# Patient Record
Sex: FEMALE | Race: WHITE | NOT HISPANIC OR LATINO | Employment: FULL TIME | URBAN - METROPOLITAN AREA
[De-identification: names, ages, dates, MRNs, and addresses within clinical notes are randomized per-mention and may not be internally consistent; named-entity substitution may affect disease eponyms.]

---

## 2017-02-10 ENCOUNTER — TRANSCRIBE ORDERS (OUTPATIENT)
Dept: ADMINISTRATIVE | Facility: HOSPITAL | Age: 37
End: 2017-02-10

## 2017-02-10 DIAGNOSIS — G47.33 OBSTRUCTIVE SLEEP APNEA (ADULT) (PEDIATRIC): Primary | ICD-10-CM

## 2017-04-11 ENCOUNTER — TRANSCRIBE ORDERS (OUTPATIENT)
Dept: SLEEP CENTER | Facility: HOSPITAL | Age: 37
End: 2017-04-11

## 2017-04-11 ENCOUNTER — HOSPITAL ENCOUNTER (OUTPATIENT)
Dept: SLEEP CENTER | Facility: HOSPITAL | Age: 37
Discharge: HOME | End: 2017-04-11
Attending: FAMILY MEDICINE
Payer: COMMERCIAL

## 2017-04-11 DIAGNOSIS — G47.33 OBSTRUCTIVE SLEEP APNEA (ADULT) (PEDIATRIC): ICD-10-CM

## 2017-04-11 DIAGNOSIS — G47.33 OBSTRUCTIVE SLEEP APNEA (ADULT) (PEDIATRIC): Primary | ICD-10-CM

## 2018-03-04 DIAGNOSIS — I10 HYPERTENSION, UNSPECIFIED TYPE: Primary | ICD-10-CM

## 2018-03-04 RX ORDER — LOSARTAN POTASSIUM AND HYDROCHLOROTHIAZIDE 12.5; 5 MG/1; MG/1
1 TABLET ORAL DAILY
COMMUNITY
End: 2018-03-04 | Stop reason: SDUPTHER

## 2018-03-05 RX ORDER — LOSARTAN POTASSIUM AND HYDROCHLOROTHIAZIDE 12.5; 5 MG/1; MG/1
1 TABLET ORAL DAILY
Qty: 30 TABLET | Refills: 4 | Status: SHIPPED | OUTPATIENT
Start: 2018-03-05 | End: 2018-03-30 | Stop reason: SDUPTHER

## 2018-03-30 DIAGNOSIS — I10 HYPERTENSION, UNSPECIFIED TYPE: Primary | ICD-10-CM

## 2018-03-30 RX ORDER — HYDROCHLOROTHIAZIDE 25 MG/1
25 TABLET ORAL 2 TIMES DAILY
Qty: 60 TABLET | Refills: 5 | Status: SHIPPED | OUTPATIENT
Start: 2018-03-30 | End: 2018-10-10 | Stop reason: SDUPTHER

## 2018-03-30 RX ORDER — POTASSIUM CHLORIDE 1500 MG/1
1 TABLET, FILM COATED, EXTENDED RELEASE ORAL 2 TIMES DAILY
Qty: 60 TABLET | Refills: 0 | Status: SHIPPED | OUTPATIENT
Start: 2018-03-30 | End: 2018-12-06 | Stop reason: SDUPTHER

## 2018-03-30 RX ORDER — LOSARTAN POTASSIUM AND HYDROCHLOROTHIAZIDE 12.5; 5 MG/1; MG/1
1 TABLET ORAL DAILY
Qty: 30 TABLET | Refills: 0 | Status: SHIPPED | OUTPATIENT
Start: 2018-03-30 | End: 2018-09-06 | Stop reason: SDUPTHER

## 2018-03-30 RX ORDER — POTASSIUM CHLORIDE 1500 MG/1
TABLET, FILM COATED, EXTENDED RELEASE ORAL
COMMUNITY
End: 2018-03-30 | Stop reason: SDUPTHER

## 2018-06-04 ENCOUNTER — OFFICE VISIT (OUTPATIENT)
Dept: ENDOCRINOLOGY | Facility: HOSPITAL | Age: 38
End: 2018-06-04
Payer: COMMERCIAL

## 2018-06-04 VITALS
BODY MASS INDEX: 34.65 KG/M2 | HEIGHT: 67 IN | SYSTOLIC BLOOD PRESSURE: 110 MMHG | DIASTOLIC BLOOD PRESSURE: 88 MMHG | WEIGHT: 220.8 LBS | HEART RATE: 88 BPM

## 2018-06-04 DIAGNOSIS — E03.9 HYPOTHYROIDISM, UNSPECIFIED TYPE: Primary | ICD-10-CM

## 2018-06-04 DIAGNOSIS — I10 HYPERTENSION, UNSPECIFIED TYPE: ICD-10-CM

## 2018-06-04 DIAGNOSIS — E89.2 HYPOPARATHYROIDISM AFTER PROCEDURE (HCC): ICD-10-CM

## 2018-06-04 DIAGNOSIS — Z86.39 HISTORY OF GRAVES' DISEASE: ICD-10-CM

## 2018-06-04 PROCEDURE — 99244 OFF/OP CNSLTJ NEW/EST MOD 40: CPT | Performed by: INTERNAL MEDICINE

## 2018-06-04 RX ORDER — LEVOTHYROXINE SODIUM 175 MCG
175 TABLET ORAL DAILY
Refills: 1 | COMMUNITY
Start: 2018-05-23 | End: 2018-12-06 | Stop reason: SDUPTHER

## 2018-06-04 RX ORDER — CALCITRIOL 0.5 UG/1
2 CAPSULE, LIQUID FILLED ORAL DAILY
Refills: 0 | COMMUNITY
Start: 2018-05-24 | End: 2018-12-06 | Stop reason: SDUPTHER

## 2018-06-04 RX ORDER — DEXAMETHASONE 1 MG
TABLET ORAL
Qty: 1 TABLET | Refills: 0 | Status: SHIPPED | OUTPATIENT
Start: 2018-06-04 | End: 2018-12-03 | Stop reason: SDUPTHER

## 2018-06-04 NOTE — LETTER
June 4, 2018     Dunia Dixon, [address] [address]    Patient: Nimesh Gonsalves   YOB: 1980   Date of Visit: 6/4/2018       Dear Dr iKmmy lFores: Thank you for referring Nimesh Gonsalves to me for evaluation  Below are my notes for this consultation  If you have questions, please do not hesitate to call me  I look forward to following your patient along with you  Sincerely,        Deanna Villarreal DO        CC: No Recipients  Deanna Villarreal DO  6/4/2018  8:25 AM  Sign at close encounter  6/4/2018    Assessment/Plan      Diagnoses and all orders for this visit:    Hypothyroidism, unspecified type  -     TSH, 3rd generation Lab Collect; Future  -     T4, free Lab Collect; Future  -     TSH, 3rd generation Lab Collect  -     T4, free Lab Collect    Hypertension, unspecified type  -     Cortisol Level, AM Specimen- Lab Collect; Future  -     Dexamethasone; Future  -     dexamethasone (DECADRON) 1 mg tablet; Take 1 tab at 10pm night before cortisol blood work  -     Aldosterone; Future  -     Renin Direct Assay; Future  -     Insulin-like growth factor; Future  -     Cortisol Level, AM Specimen- Lab Collect  -     Aldosterone  -     Renin Direct Assay  -     Insulin-like growth factor    Hypoparathyroidism after procedure (HCC)  -     Comprehensive metabolic panel; Future  -     PTH, intact; Future  -     Comprehensive metabolic panel  -     PTH, intact    History of Graves' disease    Other orders  -     calcitriol (ROCALTROL) 0 5 MCG capsule; Take 2 capsules by mouth daily  -     SYNTHROID 175 MCG tablet; Take 175 mcg by mouth daily  -     Calcium Carbonate ([employer]S CALCIUM-600 PO); Take 1,200 mg by mouth        Assessment/Plan:  1  Postsurgical hypothyroidism:  Clinically and biochemically euthyroid on Synthroid brand 175 mcg tablets daily  Continue this dose and recheck TSH free T4 before next appointment which will be in 6 months      2   Postsurgical hypoparathyroidism:  Calcium level is appropriate on calcitriol 0 5 mcg capsules, 2 tabs daily as well as 1200 mg of calcium daily  Briefly discussed Tammi Hahn as an option to treat hypoparathyroidism in the future  Plan to repeat CMP and PTH before next appointment  3   History of hypertension as well as hypokalemia, possible sleep apnea: With next set of blood work will screen for aldosterone, renin, IGF-1, 1 mg overnight dexamethasone suppression test   Will discuss at her follow-up appointment  CC:  Hypothyroidism    History of Present Illness     HPI: Hakan Schroeder is a 40y o  year old female with history of Graves disease status post thyroidectomy with resultant hypothyroidism as well as postsurgical hypoparathyroidism presents to [facility] care  Previous patient doctor Saadia  Overall she is doing well without any significant changes in her health or medications  She takes Synthroid brand 175 mcg daily  She does take calcitriol 0 5 mcg tablets, 2 tablets daily as well as 1200 mg of calcium daily  She does report occasional muscle cramping and some perioral numbness and paresthesias  This has been stable lately  In the past she notes she had a history of severe hypercalcemia but over the past few visits this has remained stable  She also takes hydrochlorothiazide for her blood pressure  He has had hypertension for about 10 years  She currently takes hydrochlorothiazide 25 mg tablets, 2 tablets daily  She is also on losartan-HCTZ combo 50-12 5 mg daily  She is also on potassium chloride 40 mEq twice a day  She reports she does have a family history of hypertension but denies knowing if anybody was diagnosed early on  She denies easy bruising or proximal muscle weakness or osteoporosis  She does have a history of requiring a fertility specialist for pregnancy  She reports normal growth, development, puberty    She was evaluated for sleep apnea but has not had a sleep study yet to confirm this  Review of Systems   Constitutional: Negative for chills, fatigue and fever  HENT: Negative for trouble swallowing and voice change  Eyes: Negative for visual disturbance  Respiratory: Negative for shortness of breath  Cardiovascular: Negative for chest pain, palpitations and leg swelling  Gastrointestinal: Negative for abdominal pain, nausea and vomiting  Endocrine: Negative for polydipsia and polyuria  Musculoskeletal: Negative for arthralgias and myalgias  Skin: Negative for rash  Neurological: Negative for dizziness, tremors and weakness  Hematological: Negative for adenopathy  Psychiatric/Behavioral: Negative for agitation and confusion  Historical Information   History reviewed  No pertinent past medical history  History reviewed  No pertinent surgical history    Social History   History   Alcohol use Not on file     History   Drug use: Unknown     History   Smoking Status    Former Smoker    Packs/day: 1 00    Types: Cigarettes    Quit date: 2009   Smokeless Tobacco    Never Used     Family History:   Family History   Problem Relation Age of Onset    Hypertension Mother     Obesity Mother     Hyperlipidemia Mother     Diabetes unspecified Father     Arthritis Father     Hypertension Father     Cancer Maternal Grandmother     Heart disease Paternal Grandmother     Heart disease Paternal Grandfather     Dementia Maternal Grandfather        Meds/Allergies   Current Outpatient Prescriptions   Medication Sig Dispense Refill    calcitriol (ROCALTROL) 0 5 MCG capsule Take 2 capsules by mouth daily  0    Calcium Carbonate (CVS CALCIUM-600 PO) Take 1,200 mg by mouth      hydrochlorothiazide (HYDRODIURIL) 25 mg tablet Take 1 tablet (25 mg total) by mouth 2 (two) times a day 60 tablet 5    losartan-hydrochlorothiazide (HYZAAR) 50-12 5 mg per tablet Take 1 tablet by mouth daily 30 tablet 0    Potassium Chloride ER 20 MEQ TBCR Take 1 tablet (20 mEq total) by mouth 2 (two) times a day (Patient taking differently: Take 2 tablets by mouth 2 (two) times a day  ) 60 tablet 0    SYNTHROID 175 MCG tablet Take 175 mcg by mouth daily  1    dexamethasone (DECADRON) 1 mg tablet Take 1 tab at 10pm night before cortisol blood work  1 tablet 0     No current facility-administered medications for this visit  No Known Allergies    Objective   Vitals: Blood pressure 110/88, pulse 88, height 5' 7" (1 702 m), weight 100 kg (220 lb 12 8 oz)  Invasive Devices          No matching active lines, drains, or airways          Physical Exam   Constitutional: She is oriented to person, place, and time  She appears well-developed and well-nourished  No distress  HENT:   Head: Normocephalic and atraumatic  Mouth/Throat: No oropharyngeal exudate  Eyes: Conjunctivae and EOM are normal  Pupils are equal, round, and reactive to light  No scleral icterus  Neck: Normal range of motion  Neck supple  No thyromegaly present  Cardiovascular: Normal rate and regular rhythm  No murmur heard  Pulmonary/Chest: Effort normal and breath sounds normal  She has no wheezes  Abdominal: Soft  Bowel sounds are normal  She exhibits no distension  There is no tenderness  Musculoskeletal: Normal range of motion  She exhibits no edema  Neurological: She is alert and oriented to person, place, and time  She exhibits normal muscle tone  Skin: Skin is warm and dry  No rash noted  She is not diaphoretic  Psychiatric: She has a normal mood and affect  Her behavior is normal    Vitals reviewed  The history was obtained from the review of the chart and from the patient  Lab Results:       Labs from lab core 5/31/18:  Glucose 103, BUN 15, creatinine 0 91, GFR 81, sodium 140, potassium 3 5, calcium 9 1, albumin 4 2, AST 36, ALT 51, alk phos 60, total cholesterol 211, triglycerides 135, HDL 44, , TSH 2 44, free thyroxine index 2 3, A1c 5 6      Future Appointments  Date Time Provider Oumou Sanders   12/6/2018 7:30 AM Susan Flaherty DO [facility]

## 2018-06-04 NOTE — PROGRESS NOTES
6/4/2018    Assessment/Plan      Diagnoses and all orders for this visit:    Hypothyroidism, unspecified type  -     TSH, 3rd generation Lab Collect; Future  -     T4, free Lab Collect; Future  -     TSH, 3rd generation Lab Collect  -     T4, free Lab Collect    Hypertension, unspecified type  -     Cortisol Level, AM Specimen- Lab Collect; Future  -     Dexamethasone; Future  -     dexamethasone (DECADRON) 1 mg tablet; Take 1 tab at 10pm night before cortisol blood work  -     Aldosterone; Future  -     Renin Direct Assay; Future  -     Insulin-like growth factor; Future  -     Cortisol Level, AM Specimen- Lab Collect  -     Aldosterone  -     Renin Direct Assay  -     Insulin-like growth factor    Hypoparathyroidism after procedure (HCC)  -     Comprehensive metabolic panel; Future  -     PTH, intact; Future  -     Comprehensive metabolic panel  -     PTH, intact    History of Graves' disease    Other orders  -     calcitriol (ROCALTROL) 0 5 MCG capsule; Take 2 capsules by mouth daily  -     SYNTHROID 175 MCG tablet; Take 175 mcg by mouth daily  -     Calcium Carbonate (CVS CALCIUM-600 PO); Take 1,200 mg by mouth        Assessment/Plan:  1  Postsurgical hypothyroidism:  Clinically and biochemically euthyroid on Synthroid brand 175 mcg tablets daily  Continue this dose and recheck TSH free T4 before next appointment which will be in 6 months  2   Postsurgical hypoparathyroidism:  Calcium level is appropriate on calcitriol 0 5 mcg capsules, 2 tabs daily as well as 1200 mg of calcium daily  Briefly discussed Angela Mola as an option to treat hypoparathyroidism in the future  Plan to repeat CMP and PTH before next appointment  3   History of hypertension as well as hypokalemia, possible sleep apnea: With next set of blood work will screen for aldosterone, renin, IGF-1, 1 mg overnight dexamethasone suppression test   Will discuss at her follow-up appointment        CC:  Hypothyroidism    History of Present Illness     HPI: Cayetano Reeves is a 40y o  year old female with history of Graves disease status post thyroidectomy with resultant hypothyroidism as well as postsurgical hypoparathyroidism presents to [facility] care  Previous patient doctor Zee  Overall she is doing well without any significant changes in her health or medications  She takes Synthroid brand 175 mcg daily  She does take calcitriol 0 5 mcg tablets, 2 tablets daily as well as 1200 mg of calcium daily  She does report occasional muscle cramping and some perioral numbness and paresthesias  This has been stable lately  In the past she notes she had a history of severe hypercalcemia but over the past few visits this has remained stable  She also takes hydrochlorothiazide for her blood pressure  He has had hypertension for about 10 years  She currently takes hydrochlorothiazide 25 mg tablets, 2 tablets daily  She is also on losartan-HCTZ combo 50-12 5 mg daily  She is also on potassium chloride 40 mEq twice a day  She reports she does have a family history of hypertension but denies knowing if anybody was diagnosed early on  She denies easy bruising or proximal muscle weakness or osteoporosis  She does have a history of requiring a fertility specialist for pregnancy  She reports normal growth, development, puberty  She was evaluated for sleep apnea but has not had a sleep study yet to confirm this  Review of Systems   Constitutional: Negative for chills, fatigue and fever  HENT: Negative for trouble swallowing and voice change  Eyes: Negative for visual disturbance  Respiratory: Negative for shortness of breath  Cardiovascular: Negative for chest pain, palpitations and leg swelling  Gastrointestinal: Negative for abdominal pain, nausea and vomiting  Endocrine: Negative for polydipsia and polyuria  Musculoskeletal: Negative for arthralgias and myalgias  Skin: Negative for rash     Neurological: Negative for dizziness, tremors and weakness  Hematological: Negative for adenopathy  Psychiatric/Behavioral: Negative for agitation and confusion  Historical Information   History reviewed  No pertinent past medical history  History reviewed  No pertinent surgical history  Social History   History   Alcohol use Not on file     History   Drug use: Unknown     History   Smoking Status    Former Smoker    Packs/day: 1 00    Types: Cigarettes    Quit date: 2009   Smokeless Tobacco    Never Used     Family History:   Family History   Problem Relation Age of Onset    Hypertension Mother     Obesity Mother     Hyperlipidemia Mother     Diabetes unspecified Father     Arthritis Father     Hypertension Father     Cancer Maternal Grandmother     Heart disease Paternal Grandmother     Heart disease Paternal Grandfather     Dementia Maternal Grandfather        Meds/Allergies   Current Outpatient Prescriptions   Medication Sig Dispense Refill    calcitriol (ROCALTROL) 0 5 MCG capsule Take 2 capsules by mouth daily  0    Calcium Carbonate (CVS CALCIUM-600 PO) Take 1,200 mg by mouth      hydrochlorothiazide (HYDRODIURIL) 25 mg tablet Take 1 tablet (25 mg total) by mouth 2 (two) times a day 60 tablet 5    losartan-hydrochlorothiazide (HYZAAR) 50-12 5 mg per tablet Take 1 tablet by mouth daily 30 tablet 0    Potassium Chloride ER 20 MEQ TBCR Take 1 tablet (20 mEq total) by mouth 2 (two) times a day (Patient taking differently: Take 2 tablets by mouth 2 (two) times a day  ) 60 tablet 0    SYNTHROID 175 MCG tablet Take 175 mcg by mouth daily  1    dexamethasone (DECADRON) 1 mg tablet Take 1 tab at 10pm night before cortisol blood work  1 tablet 0     No current facility-administered medications for this visit  No Known Allergies    Objective   Vitals: Blood pressure 110/88, pulse 88, height 5' 7" (1 702 m), weight 100 kg (220 lb 12 8 oz)    Invasive Devices          No matching active lines, drains, or airways          Physical Exam   Constitutional: She is oriented to person, place, and time  She appears well-developed and well-nourished  No distress  HENT:   Head: Normocephalic and atraumatic  Mouth/Throat: No oropharyngeal exudate  Eyes: Conjunctivae and EOM are normal  Pupils are equal, round, and reactive to light  No scleral icterus  Neck: Normal range of motion  Neck supple  No thyromegaly present  Cardiovascular: Normal rate and regular rhythm  No murmur heard  Pulmonary/Chest: Effort normal and breath sounds normal  She has no wheezes  Abdominal: Soft  Bowel sounds are normal  She exhibits no distension  There is no tenderness  Musculoskeletal: Normal range of motion  She exhibits no edema  Neurological: She is alert and oriented to person, place, and time  She exhibits normal muscle tone  Skin: Skin is warm and dry  No rash noted  She is not diaphoretic  Psychiatric: She has a normal mood and affect  Her behavior is normal    Vitals reviewed  The history was obtained from the review of the chart and from the patient  Lab Results:       Labs from lab core 5/31/18:  Glucose 103, BUN 15, creatinine 0 91, GFR 81, sodium 140, potassium 3 5, calcium 9 1, albumin 4 2, AST 36, ALT 51, alk phos 60, total cholesterol 211, triglycerides 135, HDL 44, , TSH 2 44, free thyroxine index 2 3, A1c 5 6      Future Appointments  Date Time Provider Oumou Sanders   12/6/2018 7:30 AM DO AY[facility]A [facility]

## 2018-09-06 DIAGNOSIS — I10 HYPERTENSION, UNSPECIFIED TYPE: ICD-10-CM

## 2018-09-06 RX ORDER — LOSARTAN POTASSIUM AND HYDROCHLOROTHIAZIDE 12.5; 5 MG/1; MG/1
TABLET ORAL
Qty: 30 TABLET | Refills: 0 | Status: SHIPPED | OUTPATIENT
Start: 2018-09-06 | End: 2018-10-10 | Stop reason: SDUPTHER

## 2018-10-10 DIAGNOSIS — I10 HYPERTENSION, UNSPECIFIED TYPE: ICD-10-CM

## 2018-10-10 RX ORDER — LOSARTAN POTASSIUM AND HYDROCHLOROTHIAZIDE 12.5; 5 MG/1; MG/1
TABLET ORAL
Qty: 30 TABLET | Refills: 0 | Status: SHIPPED | OUTPATIENT
Start: 2018-10-10 | End: 2018-10-31 | Stop reason: SDUPTHER

## 2018-10-10 RX ORDER — HYDROCHLOROTHIAZIDE 25 MG/1
TABLET ORAL
Qty: 60 TABLET | Refills: 5 | Status: SHIPPED | OUTPATIENT
Start: 2018-10-10 | End: 2018-12-06 | Stop reason: SDUPTHER

## 2018-10-31 DIAGNOSIS — I10 HYPERTENSION, UNSPECIFIED TYPE: ICD-10-CM

## 2018-10-31 RX ORDER — LOSARTAN POTASSIUM AND HYDROCHLOROTHIAZIDE 12.5; 5 MG/1; MG/1
TABLET ORAL
Qty: 30 TABLET | Refills: 0 | Status: SHIPPED | OUTPATIENT
Start: 2018-10-31 | End: 2018-12-06 | Stop reason: SDUPTHER

## 2018-12-03 ENCOUNTER — TELEPHONE (OUTPATIENT)
Dept: ENDOCRINOLOGY | Facility: HOSPITAL | Age: 38
End: 2018-12-03

## 2018-12-03 DIAGNOSIS — I10 HYPERTENSION, UNSPECIFIED TYPE: ICD-10-CM

## 2018-12-03 RX ORDER — DEXAMETHASONE 1 MG
TABLET ORAL
Qty: 1 TABLET | Refills: 0 | Status: SHIPPED | OUTPATIENT
Start: 2018-12-03 | End: 2018-12-06

## 2018-12-03 NOTE — TELEPHONE ENCOUNTER
Pt needs new script for dexamethasone as she never picked up the last one that was prescribed  Send to [facility] #[id number]  Has 12/6/18 appt and needs labs done before then   Thanks

## 2018-12-06 ENCOUNTER — OFFICE VISIT (OUTPATIENT)
Dept: ENDOCRINOLOGY | Facility: HOSPITAL | Age: 38
End: 2018-12-06
Payer: COMMERCIAL

## 2018-12-06 VITALS
HEART RATE: 92 BPM | HEIGHT: 67 IN | BODY MASS INDEX: 34.58 KG/M2 | DIASTOLIC BLOOD PRESSURE: 80 MMHG | SYSTOLIC BLOOD PRESSURE: 110 MMHG | WEIGHT: 220.3 LBS

## 2018-12-06 DIAGNOSIS — I10 HYPERTENSION, UNSPECIFIED TYPE: ICD-10-CM

## 2018-12-06 DIAGNOSIS — E03.9 HYPOTHYROIDISM, UNSPECIFIED TYPE: Primary | ICD-10-CM

## 2018-12-06 DIAGNOSIS — Z86.39 HISTORY OF GRAVES' DISEASE: ICD-10-CM

## 2018-12-06 DIAGNOSIS — E89.2 HYPOPARATHYROIDISM AFTER PROCEDURE (HCC): ICD-10-CM

## 2018-12-06 DIAGNOSIS — R73.01 IMPAIRED FASTING GLUCOSE: ICD-10-CM

## 2018-12-06 PROCEDURE — 99214 OFFICE O/P EST MOD 30 MIN: CPT | Performed by: INTERNAL MEDICINE

## 2018-12-06 RX ORDER — CALCITRIOL 0.5 UG/1
1 CAPSULE, LIQUID FILLED ORAL DAILY
Qty: 30 CAPSULE | Refills: 11 | Status: SHIPPED | OUTPATIENT
Start: 2018-12-06 | End: 2018-12-20 | Stop reason: SDUPTHER

## 2018-12-06 RX ORDER — LEVOTHYROXINE SODIUM 175 MCG
TABLET ORAL
Qty: 30 TABLET | Refills: 11 | Status: SHIPPED | OUTPATIENT
Start: 2018-12-06 | End: 2019-12-20 | Stop reason: SDUPTHER

## 2018-12-06 RX ORDER — LOSARTAN POTASSIUM AND HYDROCHLOROTHIAZIDE 12.5; 5 MG/1; MG/1
1 TABLET ORAL DAILY
Qty: 30 TABLET | Refills: 11 | Status: SHIPPED | OUTPATIENT
Start: 2018-12-06 | End: 2019-12-20 | Stop reason: SDUPTHER

## 2018-12-06 RX ORDER — POTASSIUM CHLORIDE 1500 MG/1
2 TABLET, FILM COATED, EXTENDED RELEASE ORAL 2 TIMES DAILY
Qty: 120 TABLET | Refills: 11 | Status: SHIPPED | OUTPATIENT
Start: 2018-12-06 | End: 2019-12-20 | Stop reason: SDUPTHER

## 2018-12-06 RX ORDER — HYDROCHLOROTHIAZIDE 25 MG/1
25 TABLET ORAL 2 TIMES DAILY
Qty: 60 TABLET | Refills: 11 | Status: SHIPPED | OUTPATIENT
Start: 2018-12-06 | End: 2019-12-20 | Stop reason: SDUPTHER

## 2018-12-06 NOTE — LETTER
December 6, 2018     Charles Rushing, [address] [address]    Patient: Sharon Brewer   YOB: 1980   Date of Visit: 12/6/2018       Dear Dr Sidney Aviles: Thank you for referring Sharon Brewer to me for evaluation  Below are my notes for this consultation  If you have questions, please do not hesitate to call me  I look forward to following your patient along with you  Sincerely,        Heather Castillo DO        CC: No Recipients  Heather Castillo DO  12/6/2018  7:51 AM  Sign at close encounter  12/6/2018    Assessment/Plan      Diagnoses and all orders for this visit:    Hypothyroidism, unspecified type  -     SYNTHROID 175 MCG tablet; 1 tab daily  BRAND NECESSARY  -     TSH, 3rd generation Lab Collect; Future  -     T4, free Lab Collect; Future  -     TSH, 3rd generation Lab Collect  -     T4, free Lab Collect    Hypoparathyroidism after procedure (HCC)  -     calcitriol (ROCALTROL) 0 5 MCG capsule; Take 2 capsules (1 mcg total) by mouth daily  -     Comprehensive metabolic panel Lab Collect; Future  -     Comprehensive metabolic panel Lab Collect  -     Basic metabolic panel Lab Collect; Future  -     Basic metabolic panel Lab Collect    Hypertension, unspecified type  -     losartan-hydrochlorothiazide (HYZAAR) 50-12 5 mg per tablet; Take 1 tablet by mouth daily  -     hydrochlorothiazide (HYDRODIURIL) 25 mg tablet; Take 1 tablet (25 mg total) by mouth 2 (two) times a day  -     Potassium Chloride ER 20 MEQ TBCR; Take 2 tablets (40 mEq total) by mouth 2 (two) times a day    History of Graves' disease        Assessment/Plan:  1  Hypothyroidism postoperative:  Clinically and biochemically euthyroid on current Synthroid regimen  Continue current dose and see her back in 6 months with labs just prior  2   Hypoparathyroidism:  Calcium was low in recent blood work, but she does need a refill of her calcitriol  Continue current calcitriol dose and calcium  We will be decreasing her hydrochlorothiazide dose slightly so I have asked her to go for a repeat BMP 1 week after resuming her calcitriol and cutting her hydrochlorothiazide dose to 25 mg just once a day  She will continue the losartan-hydrochlorothiazide 50-12 5 mg daily as well  We will call her with these results  Plan to repeat CMP before next appointment  3   Hypertension:  I suggested that she get a blood pressure monitor at home to monitor blood pressure since we are reducing her hydrochlorothiazide dose of 25 mg daily and continuing her current losartan-hydrochlorothiazide combination dose  She had a normal IGF-1 level as well as a normal 1 mg overnight dexamethasone suppression test   Aldosterone and renin levels are still pending  Will call her with these results  4   Impaired fasting glucose:  Encourage diet, exercise, lifestyle, weight loss modifications  Will monitor this over time  CC: Hypothyroidism and hypoparathyroidism follow up    History of Present Illness     HPI: Aditi Lundberg is a 40y o  year old female with history of Graves disease status post thyroidectomy with resultant hypothyroidism as well as complicated by postsurgical hypoparathyroidism who presents for follow-up appointment  Overall she is feeling well without any significant new symptoms or changes to her health or medications  She is on Synthroid brand 175 mcg daily  Takes medication appropriately  For hypoparathyroidism she is on calcitriol 0 5 mcg tablets in takes 2 tablets daily as well as 1200 mg of calcium daily  She also has a history of hypertension managed on losartan-hydrochlorothiazide 50-12 5 mg daily  She also takes a separate hydrochlorothiazide 25 mg tablet  She is on potassium 40 mEq twice a day as well  She presents today for a follow-up appointment overall feels well  She has noticed that she has gained some weight recently and will be keeping a close eye on this    She has been out of her calcitriol for a few days which explains her low calcium in her recent blood work  She has had some more numbness and tingling in her face recently which she does not usually have she is on the calcitriol  Otherwise she feels well and denies any symptoms related bowl to hypothyroidism or hyperthyroidism  Review of Systems   Constitutional: Negative for fatigue  HENT: Negative for trouble swallowing and voice change  Eyes: Negative for visual disturbance  Respiratory: Negative for shortness of breath  Cardiovascular: Negative for palpitations and leg swelling  Gastrointestinal: Negative for abdominal pain, nausea and vomiting  Endocrine: Negative for cold intolerance, heat intolerance, polydipsia and polyuria  Musculoskeletal: Negative for arthralgias and myalgias  Skin: Negative for rash  Neurological: Negative for dizziness, tremors and weakness  Hematological: Negative for adenopathy  Psychiatric/Behavioral: Negative for agitation and confusion  Historical Information   No past medical history on file  No past surgical history on file    Social History   History   Alcohol use Not on file     History   Drug use: Unknown     History   Smoking Status    Former Smoker    Packs/day: 1 00    Types: Cigarettes    Quit date: 2009   Smokeless Tobacco    Never Used     Family History:   Family History   Problem Relation Age of Onset    Hypertension Mother     Obesity Mother     Hyperlipidemia Mother     Diabetes unspecified Father     Arthritis Father     Hypertension Father     Cancer Maternal Grandmother     Heart disease Paternal Grandmother     Heart disease Paternal Grandfather     Dementia Maternal Grandfather        Meds/Allergies   Current Outpatient Prescriptions   Medication Sig Dispense Refill    calcitriol (ROCALTROL) 0 5 MCG capsule Take 2 capsules (1 mcg total) by mouth daily 30 capsule 11    Calcium Carbonate (CVS CALCIUM-600 PO) Take 1,200 mg by mouth  hydrochlorothiazide (HYDRODIURIL) 25 mg tablet Take 1 tablet (25 mg total) by mouth 2 (two) times a day 60 tablet 11    losartan-hydrochlorothiazide (HYZAAR) 50-12 5 mg per tablet Take 1 tablet by mouth daily 30 tablet 11    Potassium Chloride ER 20 MEQ TBCR Take 2 tablets (40 mEq total) by mouth 2 (two) times a day 120 tablet 11    SYNTHROID 175 MCG tablet 1 tab daily  BRAND NECESSARY  30 tablet 11     No current facility-administered medications for this visit  No Known Allergies    Objective   Vitals: Blood pressure 110/80, pulse 92, height 5' 7" (1 702 m), weight 99 9 kg (220 lb 4 8 oz)  Invasive Devices          No matching active lines, drains, or airways          Physical Exam   Constitutional: She is oriented to person, place, and time  She appears well-developed and well-nourished  No distress  HENT:   Head: Normocephalic and atraumatic  Eyes: Pupils are equal, round, and reactive to light  Conjunctivae are normal    Neck: Normal range of motion  Neck supple  No thyromegaly present  Cardiovascular: Normal rate and regular rhythm  Pulmonary/Chest: Effort normal and breath sounds normal  No respiratory distress  Abdominal: Soft  Bowel sounds are normal  She exhibits no distension  Musculoskeletal: Normal range of motion  She exhibits no edema  Neurological: She is alert and oriented to person, place, and time  She exhibits normal muscle tone  Skin: Skin is warm and dry  No rash noted  She is not diaphoretic  Psychiatric: She has a normal mood and affect  Her behavior is normal    Vitals reviewed  The history was obtained from the review of the chart and from the patient  Lab Results:      Labs from [address] [place] [address] on 12/04/2018:  Glucose 114, BUN 16, creatinine 0 84, GFR 89, sodium 142, potassium 4 1, calcium 8 5, albumin 4 5, bilirubin 0 2, alk phos 65, AST 39, ALT 54, free T4 1 3, TSH 2 22, IGF-1 101, PTH 10, a m  cortisol after 1 mg dexamethasone 0 7        No future appointments  Portions of the record may have been created with voice recognition software  Occasional wrong word or "sound a like" substitutions may have occurred due to the inherent limitations of voice recognition software  Read the chart carefully and recognize, using context, where substitutions have occurred

## 2018-12-08 LAB
ALBUMIN SERPL-MCNC: 4.5 G/DL (ref 3.5–5.5)
ALBUMIN/GLOB SERPL: 1.5 {RATIO} (ref 1.2–2.2)
ALDOST SERPL-MCNC: 15.9 NG/DL (ref 0–30)
ALP SERPL-CCNC: 65 IU/L (ref 39–117)
ALT SERPL-CCNC: 54 IU/L (ref 0–32)
AST SERPL-CCNC: 39 IU/L (ref 0–40)
BILIRUB SERPL-MCNC: 0.2 MG/DL (ref 0–1.2)
BUN SERPL-MCNC: 16 MG/DL (ref 6–20)
BUN/CREAT SERPL: 19 (ref 9–23)
CALCIUM SERPL-MCNC: 8.5 MG/DL (ref 8.7–10.2)
CHLORIDE SERPL-SCNC: 98 MMOL/L (ref 96–106)
CO2 SERPL-SCNC: 27 MMOL/L (ref 20–29)
CORTIS AM PEAK SERPL-MCNC: 0.7 UG/DL (ref 6.2–19.4)
CREAT SERPL-MCNC: 0.84 MG/DL (ref 0.57–1)
GLOBULIN SER-MCNC: 3 G/DL (ref 1.5–4.5)
GLUCOSE SERPL-MCNC: 114 MG/DL (ref 65–99)
IGF-I SERPL-MCNC: 101 NG/ML (ref 69–227)
POTASSIUM SERPL-SCNC: 4.1 MMOL/L (ref 3.5–5.2)
PROT SERPL-MCNC: 7.5 G/DL (ref 6–8.5)
PTH-INTACT SERPL-MCNC: 10 PG/ML (ref 15–65)
RENIN PLAS-CCNC: 2.66 NG/ML/HR (ref 0.17–5.38)
SL AMB EGFR AFRICAN AMERICAN: 103 ML/MIN/1.73
SL AMB EGFR NON AFRICAN AMERICAN: 89 ML/MIN/1.73
SODIUM SERPL-SCNC: 142 MMOL/L (ref 134–144)
T4 FREE SERPL-MCNC: 1.3 NG/DL (ref 0.82–1.77)
TSH SERPL DL<=0.005 MIU/L-ACNC: 2.22 UIU/ML (ref 0.45–4.5)

## 2018-12-20 DIAGNOSIS — E89.2 HYPOPARATHYROIDISM AFTER PROCEDURE (HCC): ICD-10-CM

## 2018-12-20 NOTE — TELEPHONE ENCOUNTER
Pt takes her Calcitriol twice daily  When it was sent to the pharmacy it should have been sent at #60 not #30  Please resend

## 2018-12-21 RX ORDER — CALCITRIOL 0.5 UG/1
1 CAPSULE, LIQUID FILLED ORAL DAILY
Qty: 60 CAPSULE | Refills: 11 | Status: SHIPPED | OUTPATIENT
Start: 2018-12-21 | End: 2019-12-20 | Stop reason: SDUPTHER

## 2019-06-19 ENCOUNTER — OFFICE VISIT (OUTPATIENT)
Dept: ENDOCRINOLOGY | Facility: HOSPITAL | Age: 39
End: 2019-06-19
Payer: COMMERCIAL

## 2019-06-19 VITALS
BODY MASS INDEX: 36.91 KG/M2 | SYSTOLIC BLOOD PRESSURE: 120 MMHG | HEIGHT: 67 IN | DIASTOLIC BLOOD PRESSURE: 90 MMHG | HEART RATE: 105 BPM | WEIGHT: 235.2 LBS

## 2019-06-19 DIAGNOSIS — E03.9 HYPOTHYROIDISM, UNSPECIFIED TYPE: Primary | ICD-10-CM

## 2019-06-19 DIAGNOSIS — R73.01 IMPAIRED FASTING GLUCOSE: ICD-10-CM

## 2019-06-19 DIAGNOSIS — I10 ESSENTIAL HYPERTENSION: ICD-10-CM

## 2019-06-19 DIAGNOSIS — Z86.39 HISTORY OF GRAVES' DISEASE: ICD-10-CM

## 2019-06-19 DIAGNOSIS — E89.2 HYPOPARATHYROIDISM AFTER PROCEDURE (HCC): ICD-10-CM

## 2019-06-19 LAB
ALBUMIN SERPL-MCNC: 4.4 G/DL (ref 3.5–5.5)
ALBUMIN/GLOB SERPL: 1.6 {RATIO} (ref 1.2–2.2)
ALP SERPL-CCNC: 61 IU/L (ref 39–117)
ALT SERPL-CCNC: 76 IU/L (ref 0–32)
AST SERPL-CCNC: 58 IU/L (ref 0–40)
BILIRUB SERPL-MCNC: 0.4 MG/DL (ref 0–1.2)
BUN SERPL-MCNC: 19 MG/DL (ref 6–20)
BUN/CREAT SERPL: 20 (ref 9–23)
CALCIUM SERPL-MCNC: 9.7 MG/DL (ref 8.7–10.2)
CHLORIDE SERPL-SCNC: 99 MMOL/L (ref 96–106)
CO2 SERPL-SCNC: 27 MMOL/L (ref 20–29)
CREAT SERPL-MCNC: 0.95 MG/DL (ref 0.57–1)
GLOBULIN SER-MCNC: 2.7 G/DL (ref 1.5–4.5)
GLUCOSE SERPL-MCNC: 94 MG/DL (ref 65–99)
POTASSIUM SERPL-SCNC: 3.7 MMOL/L (ref 3.5–5.2)
PROT SERPL-MCNC: 7.1 G/DL (ref 6–8.5)
SL AMB EGFR AFRICAN AMERICAN: 88 ML/MIN/1.73
SL AMB EGFR NON AFRICAN AMERICAN: 76 ML/MIN/1.73
SODIUM SERPL-SCNC: 141 MMOL/L (ref 134–144)
T4 FREE SERPL-MCNC: 1.66 NG/DL (ref 0.82–1.77)
TSH SERPL DL<=0.005 MIU/L-ACNC: 2.74 UIU/ML (ref 0.45–4.5)

## 2019-06-19 PROCEDURE — 99214 OFFICE O/P EST MOD 30 MIN: CPT | Performed by: INTERNAL MEDICINE

## 2019-12-19 LAB
ALBUMIN SERPL-MCNC: 4.2 G/DL (ref 3.5–5.5)
ALBUMIN/GLOB SERPL: 1.6 {RATIO} (ref 1.2–2.2)
ALP SERPL-CCNC: 66 IU/L (ref 39–117)
ALT SERPL-CCNC: 52 IU/L (ref 0–32)
AST SERPL-CCNC: 41 IU/L (ref 0–40)
BILIRUB SERPL-MCNC: 0.3 MG/DL (ref 0–1.2)
BUN SERPL-MCNC: 12 MG/DL (ref 6–20)
BUN/CREAT SERPL: 13 (ref 9–23)
CALCIUM SERPL-MCNC: 9 MG/DL (ref 8.7–10.2)
CHLORIDE SERPL-SCNC: 98 MMOL/L (ref 96–106)
CO2 SERPL-SCNC: 26 MMOL/L (ref 20–29)
CREAT SERPL-MCNC: 0.96 MG/DL (ref 0.57–1)
EST. AVERAGE GLUCOSE BLD GHB EST-MCNC: 126 MG/DL
GLOBULIN SER-MCNC: 2.7 G/DL (ref 1.5–4.5)
GLUCOSE SERPL-MCNC: 86 MG/DL (ref 65–99)
HBA1C MFR BLD: 6 % (ref 4.8–5.6)
POTASSIUM SERPL-SCNC: 3.6 MMOL/L (ref 3.5–5.2)
PROT SERPL-MCNC: 6.9 G/DL (ref 6–8.5)
SL AMB EGFR AFRICAN AMERICAN: 86 ML/MIN/1.73
SL AMB EGFR NON AFRICAN AMERICAN: 75 ML/MIN/1.73
SODIUM SERPL-SCNC: 140 MMOL/L (ref 134–144)
T4 FREE SERPL-MCNC: 1.57 NG/DL (ref 0.82–1.77)
TSH SERPL DL<=0.005 MIU/L-ACNC: 4.79 UIU/ML (ref 0.45–4.5)

## 2019-12-20 ENCOUNTER — OFFICE VISIT (OUTPATIENT)
Dept: ENDOCRINOLOGY | Facility: HOSPITAL | Age: 39
End: 2019-12-20
Payer: COMMERCIAL

## 2019-12-20 VITALS
BODY MASS INDEX: 37.07 KG/M2 | DIASTOLIC BLOOD PRESSURE: 64 MMHG | SYSTOLIC BLOOD PRESSURE: 102 MMHG | HEART RATE: 110 BPM | WEIGHT: 236.2 LBS | HEIGHT: 67 IN

## 2019-12-20 DIAGNOSIS — I10 HYPERTENSION, UNSPECIFIED TYPE: ICD-10-CM

## 2019-12-20 DIAGNOSIS — R73.03 PREDIABETES: ICD-10-CM

## 2019-12-20 DIAGNOSIS — E89.2 HYPOPARATHYROIDISM AFTER PROCEDURE (HCC): ICD-10-CM

## 2019-12-20 DIAGNOSIS — Z86.39 HISTORY OF GRAVES' DISEASE: ICD-10-CM

## 2019-12-20 DIAGNOSIS — I10 ESSENTIAL HYPERTENSION: ICD-10-CM

## 2019-12-20 DIAGNOSIS — E03.9 HYPOTHYROIDISM, UNSPECIFIED TYPE: Primary | ICD-10-CM

## 2019-12-20 PROCEDURE — 99214 OFFICE O/P EST MOD 30 MIN: CPT | Performed by: INTERNAL MEDICINE

## 2019-12-20 PROCEDURE — 3074F SYST BP LT 130 MM HG: CPT | Performed by: INTERNAL MEDICINE

## 2019-12-20 PROCEDURE — 3078F DIAST BP <80 MM HG: CPT | Performed by: INTERNAL MEDICINE

## 2019-12-20 RX ORDER — LEVOTHYROXINE SODIUM 175 MCG
TABLET ORAL
Qty: 30 TABLET | Refills: 11 | Status: SHIPPED | OUTPATIENT
Start: 2019-12-20 | End: 2020-04-09 | Stop reason: SDUPTHER

## 2019-12-20 RX ORDER — LOSARTAN POTASSIUM AND HYDROCHLOROTHIAZIDE 12.5; 5 MG/1; MG/1
1 TABLET ORAL DAILY
Qty: 30 TABLET | Refills: 11 | Status: SHIPPED | OUTPATIENT
Start: 2019-12-20 | End: 2020-12-28 | Stop reason: SDUPTHER

## 2019-12-20 RX ORDER — HYDROCHLOROTHIAZIDE 25 MG/1
25 TABLET ORAL 2 TIMES DAILY
Qty: 60 TABLET | Refills: 11 | Status: SHIPPED | OUTPATIENT
Start: 2019-12-20 | End: 2020-12-28

## 2019-12-20 RX ORDER — POTASSIUM CHLORIDE 1500 MG/1
2 TABLET, FILM COATED, EXTENDED RELEASE ORAL 2 TIMES DAILY
Qty: 120 TABLET | Refills: 11 | Status: SHIPPED | OUTPATIENT
Start: 2019-12-20 | End: 2020-12-28 | Stop reason: SDUPTHER

## 2019-12-20 RX ORDER — CALCITRIOL 0.5 UG/1
1 CAPSULE, LIQUID FILLED ORAL DAILY
Qty: 60 CAPSULE | Refills: 11 | Status: SHIPPED | OUTPATIENT
Start: 2019-12-20 | End: 2020-12-28 | Stop reason: SDUPTHER
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
## 2020-04-07 LAB
ALBUMIN SERPL-MCNC: 4.3 G/DL (ref 3.8–4.8)
ALBUMIN/GLOB SERPL: 1.7 {RATIO} (ref 1.2–2.2)
ALP SERPL-CCNC: 68 IU/L (ref 39–117)
ALT SERPL-CCNC: 64 IU/L (ref 0–32)
AST SERPL-CCNC: 54 IU/L (ref 0–40)
BILIRUB SERPL-MCNC: 0.3 MG/DL (ref 0–1.2)
BUN SERPL-MCNC: 18 MG/DL (ref 6–20)
BUN/CREAT SERPL: 17 (ref 9–23)
CALCIUM SERPL-MCNC: 9.1 MG/DL (ref 8.7–10.2)
CHLORIDE SERPL-SCNC: 96 MMOL/L (ref 96–106)
CO2 SERPL-SCNC: 28 MMOL/L (ref 20–29)
CREAT SERPL-MCNC: 1.08 MG/DL (ref 0.57–1)
GLOBULIN SER-MCNC: 2.5 G/DL (ref 1.5–4.5)
GLUCOSE SERPL-MCNC: 91 MG/DL (ref 65–99)
POTASSIUM SERPL-SCNC: 3.8 MMOL/L (ref 3.5–5.2)
PROT SERPL-MCNC: 6.8 G/DL (ref 6–8.5)
SL AMB EGFR AFRICAN AMERICAN: 75 ML/MIN/1.73
SL AMB EGFR NON AFRICAN AMERICAN: 65 ML/MIN/1.73
SODIUM SERPL-SCNC: 141 MMOL/L (ref 134–144)
T4 FREE SERPL-MCNC: 1.44 NG/DL (ref 0.82–1.77)
TSH SERPL DL<=0.005 MIU/L-ACNC: 6 UIU/ML (ref 0.45–4.5)

## 2020-04-09 ENCOUNTER — TELEMEDICINE (OUTPATIENT)
Dept: ENDOCRINOLOGY | Facility: HOSPITAL | Age: 40
End: 2020-04-09
Payer: COMMERCIAL

## 2020-04-09 DIAGNOSIS — Z86.39 HISTORY OF GRAVES' DISEASE: ICD-10-CM

## 2020-04-09 DIAGNOSIS — E89.2 HYPOPARATHYROIDISM AFTER PROCEDURE (HCC): ICD-10-CM

## 2020-04-09 DIAGNOSIS — E03.9 HYPOTHYROIDISM, UNSPECIFIED TYPE: Primary | ICD-10-CM

## 2020-04-09 DIAGNOSIS — E66.9 CLASS 1 OBESITY WITHOUT SERIOUS COMORBIDITY WITH BODY MASS INDEX (BMI) OF 34.0 TO 34.9 IN ADULT, UNSPECIFIED OBESITY TYPE: ICD-10-CM

## 2020-04-09 DIAGNOSIS — R73.03 PREDIABETES: ICD-10-CM

## 2020-04-09 PROCEDURE — 99214 OFFICE O/P EST MOD 30 MIN: CPT | Performed by: INTERNAL MEDICINE

## 2020-04-09 RX ORDER — PHENTERMINE HYDROCHLORIDE 15 MG/1
CAPSULE ORAL
Qty: 30 CAPSULE | Refills: 2 | Status: SHIPPED | OUTPATIENT
Start: 2020-04-09 | End: 2020-11-06

## 2020-04-09 RX ORDER — LEVOTHYROXINE SODIUM 175 MCG
TABLET ORAL
Qty: 32 TABLET | Refills: 11
Start: 2020-04-09 | End: 2020-11-06

## 2020-11-05 LAB
ALBUMIN SERPL-MCNC: 4.3 G/DL (ref 3.8–4.8)
ALBUMIN/GLOB SERPL: 1.5 {RATIO} (ref 1.2–2.2)
ALP SERPL-CCNC: 73 IU/L (ref 39–117)
ALT SERPL-CCNC: 46 IU/L (ref 0–32)
AST SERPL-CCNC: 41 IU/L (ref 0–40)
BILIRUB SERPL-MCNC: 0.5 MG/DL (ref 0–1.2)
BUN SERPL-MCNC: 13 MG/DL (ref 6–20)
BUN/CREAT SERPL: 13 (ref 9–23)
CALCIUM SERPL-MCNC: 9.2 MG/DL (ref 8.7–10.2)
CHLORIDE SERPL-SCNC: 96 MMOL/L (ref 96–106)
CO2 SERPL-SCNC: 28 MMOL/L (ref 20–29)
CREAT SERPL-MCNC: 1.02 MG/DL (ref 0.57–1)
GLOBULIN SER-MCNC: 2.8 G/DL (ref 1.5–4.5)
GLUCOSE SERPL-MCNC: 97 MG/DL (ref 65–99)
POTASSIUM SERPL-SCNC: 3.8 MMOL/L (ref 3.5–5.2)
PROT SERPL-MCNC: 7.1 G/DL (ref 6–8.5)
SL AMB EGFR AFRICAN AMERICAN: 80 ML/MIN/1.73
SL AMB EGFR NON AFRICAN AMERICAN: 69 ML/MIN/1.73
SODIUM SERPL-SCNC: 137 MMOL/L (ref 134–144)
T4 FREE SERPL-MCNC: 1.52 NG/DL (ref 0.82–1.77)
TSH SERPL DL<=0.005 MIU/L-ACNC: 9.74 UIU/ML (ref 0.45–4.5)

## 2020-11-06 ENCOUNTER — OFFICE VISIT (OUTPATIENT)
Dept: ENDOCRINOLOGY | Facility: HOSPITAL | Age: 40
End: 2020-11-06
Payer: COMMERCIAL

## 2020-11-06 VITALS
DIASTOLIC BLOOD PRESSURE: 80 MMHG | TEMPERATURE: 98.2 F | WEIGHT: 233.8 LBS | SYSTOLIC BLOOD PRESSURE: 108 MMHG | HEART RATE: 104 BPM | BODY MASS INDEX: 36.7 KG/M2 | HEIGHT: 67 IN

## 2020-11-06 DIAGNOSIS — R73.03 PREDIABETES: ICD-10-CM

## 2020-11-06 DIAGNOSIS — E03.9 HYPOTHYROIDISM, UNSPECIFIED TYPE: ICD-10-CM

## 2020-11-06 DIAGNOSIS — E89.0 POSTOPERATIVE HYPOTHYROIDISM: Primary | ICD-10-CM

## 2020-11-06 DIAGNOSIS — E89.2 HYPOPARATHYROIDISM AFTER PROCEDURE (HCC): ICD-10-CM

## 2020-11-06 DIAGNOSIS — E66.9 CLASS 1 OBESITY WITHOUT SERIOUS COMORBIDITY WITH BODY MASS INDEX (BMI) OF 34.0 TO 34.9 IN ADULT, UNSPECIFIED OBESITY TYPE: ICD-10-CM

## 2020-11-06 DIAGNOSIS — I10 ESSENTIAL HYPERTENSION: ICD-10-CM

## 2020-11-06 DIAGNOSIS — Z86.39 HISTORY OF GRAVES' DISEASE: ICD-10-CM

## 2020-11-06 PROCEDURE — 99214 OFFICE O/P EST MOD 30 MIN: CPT | Performed by: INTERNAL MEDICINE

## 2020-11-06 RX ORDER — LEVOTHYROXINE SODIUM 0.2 MG/1
TABLET ORAL
Qty: 30 TABLET | Refills: 3 | Status: SHIPPED | OUTPATIENT
Start: 2020-11-06 | End: 2021-03-02

## 2020-11-11 ENCOUNTER — TELEPHONE (OUTPATIENT)
Dept: ENDOCRINOLOGY | Facility: HOSPITAL | Age: 40
End: 2020-11-11

## 2020-11-11 DIAGNOSIS — E66.9 CLASS 1 OBESITY WITHOUT SERIOUS COMORBIDITY WITH BODY MASS INDEX (BMI) OF 34.0 TO 34.9 IN ADULT, UNSPECIFIED OBESITY TYPE: Primary | ICD-10-CM

## 2020-11-11 RX ORDER — TOPIRAMATE 50 MG/1
TABLET, FILM COATED ORAL
Qty: 30 TABLET | Refills: 5 | Status: SHIPPED | OUTPATIENT
Start: 2020-11-11 | End: 2021-05-14

## 2020-12-28 DIAGNOSIS — I10 HYPERTENSION, UNSPECIFIED TYPE: ICD-10-CM

## 2020-12-28 DIAGNOSIS — E89.2 HYPOPARATHYROIDISM AFTER PROCEDURE (HCC): ICD-10-CM

## 2020-12-28 RX ORDER — ENALAPRIL MALEATE AND HYDROCHLOROTHIAZIDE 10; 25 MG/1; MG/1
1 TABLET ORAL DAILY
Qty: 60 TABLET | Refills: 5 | Status: SHIPPED | OUTPATIENT
Start: 2020-12-28 | End: 2021-01-31

## 2020-12-28 RX ORDER — CALCITRIOL 0.5 UG/1
1 CAPSULE, LIQUID FILLED ORAL DAILY
Qty: 60 CAPSULE | Refills: 11 | Status: SHIPPED | OUTPATIENT
Start: 2020-12-28 | End: 2022-01-17

## 2020-12-28 RX ORDER — POTASSIUM CHLORIDE 1500 MG/1
2 TABLET, FILM COATED, EXTENDED RELEASE ORAL 2 TIMES DAILY
Qty: 120 TABLET | Refills: 11 | Status: SHIPPED | OUTPATIENT
Start: 2020-12-28 | End: 2022-01-12

## 2020-12-28 RX ORDER — LOSARTAN POTASSIUM AND HYDROCHLOROTHIAZIDE 12.5; 5 MG/1; MG/1
1 TABLET ORAL DAILY
Qty: 30 TABLET | Refills: 11 | Status: SHIPPED | OUTPATIENT
Start: 2020-12-28 | End: 2021-05-14

## 2021-01-31 DIAGNOSIS — I10 HYPERTENSION, UNSPECIFIED TYPE: ICD-10-CM

## 2021-01-31 RX ORDER — HYDROCHLOROTHIAZIDE 25 MG/1
25 TABLET ORAL DAILY
Qty: 60 TABLET | Refills: 5 | Status: SHIPPED | OUTPATIENT
Start: 2021-01-31 | End: 2021-05-14 | Stop reason: SDUPTHER

## 2021-03-02 DIAGNOSIS — E89.0 POSTOPERATIVE HYPOTHYROIDISM: ICD-10-CM

## 2021-03-02 RX ORDER — LEVOTHYROXINE SODIUM 200 MCG
TABLET ORAL
Qty: 30 TABLET | Refills: 3 | Status: SHIPPED | OUTPATIENT
Start: 2021-03-02 | End: 2021-05-14 | Stop reason: SDUPTHER

## 2021-03-22 ENCOUNTER — DOCUMENTATION (OUTPATIENT)
Dept: ENDOCRINOLOGY | Facility: HOSPITAL | Age: 41
End: 2021-03-22

## 2021-03-22 NOTE — PROGRESS NOTES
Received fax stating denial because Cassidy Federico 8mg is not considered a covered pharmacy benefit  Please advise

## 2021-03-24 NOTE — PROGRESS NOTES
Can I write a letter to appeal since she did not tolerate the phentermine 15 mg dose due to side effects?

## 2021-03-25 ENCOUNTER — TELEPHONE (OUTPATIENT)
Dept: ENDOCRINOLOGY | Facility: HOSPITAL | Age: 41
End: 2021-03-25

## 2021-03-25 NOTE — TELEPHONE ENCOUNTER
I did receive a call back reagarding this pt's medication  phenterimine does come in a 8mg tablet and it is also under another brand Buster Canter) which was denied by the insurance  The rep will call me back tomorrow to clarify what documention is needed from us

## 2021-03-25 NOTE — TELEPHONE ENCOUNTER
Received a call requesting more information regarding a denial for Glad[facility]  I left our information for them to return our call  They did not state what additional information was needed

## 2021-03-25 NOTE — PROGRESS NOTES
Someone from the Appeal Department with her insurance left message asking for a call back about this  Her number is [phone]

## 2021-05-12 LAB
BUN SERPL-MCNC: 10 MG/DL (ref 6–24)
BUN/CREAT SERPL: 11 (ref 9–23)
CALCIUM SERPL-MCNC: 8.6 MG/DL (ref 8.7–10.2)
CHLORIDE SERPL-SCNC: 99 MMOL/L (ref 96–106)
CO2 SERPL-SCNC: 28 MMOL/L (ref 20–29)
CREAT SERPL-MCNC: 0.91 MG/DL (ref 0.57–1)
GLUCOSE SERPL-MCNC: 92 MG/DL (ref 65–99)
POTASSIUM SERPL-SCNC: 3.6 MMOL/L (ref 3.5–5.2)
SL AMB EGFR AFRICAN AMERICAN: 91 ML/MIN/1.73
SL AMB EGFR NON AFRICAN AMERICAN: 79 ML/MIN/1.73
SODIUM SERPL-SCNC: 142 MMOL/L (ref 134–144)

## 2021-05-14 ENCOUNTER — OFFICE VISIT (OUTPATIENT)
Dept: ENDOCRINOLOGY | Facility: HOSPITAL | Age: 41
End: 2021-05-14
Payer: COMMERCIAL

## 2021-05-14 ENCOUNTER — TELEPHONE (OUTPATIENT)
Dept: ENDOCRINOLOGY | Facility: HOSPITAL | Age: 41
End: 2021-05-14

## 2021-05-14 VITALS
DIASTOLIC BLOOD PRESSURE: 84 MMHG | WEIGHT: 220.4 LBS | BODY MASS INDEX: 34.59 KG/M2 | HEIGHT: 67 IN | HEART RATE: 97 BPM | SYSTOLIC BLOOD PRESSURE: 122 MMHG

## 2021-05-14 DIAGNOSIS — E89.2 HYPOPARATHYROIDISM AFTER PROCEDURE (HCC): ICD-10-CM

## 2021-05-14 DIAGNOSIS — I10 HYPERTENSION, UNSPECIFIED TYPE: ICD-10-CM

## 2021-05-14 DIAGNOSIS — E89.0 POSTOPERATIVE HYPOTHYROIDISM: Primary | ICD-10-CM

## 2021-05-14 PROCEDURE — 99214 OFFICE O/P EST MOD 30 MIN: CPT | Performed by: INTERNAL MEDICINE

## 2021-05-14 RX ORDER — HYDROCHLOROTHIAZIDE 25 MG/1
25 TABLET ORAL DAILY
Qty: 30 TABLET | Refills: 11 | Status: SHIPPED | OUTPATIENT
Start: 2021-05-14 | End: 2022-05-18 | Stop reason: SDUPTHER

## 2021-05-14 RX ORDER — LEVOTHYROXINE SODIUM 200 MCG
TABLET ORAL
Qty: 30 TABLET | Refills: 11 | Status: SHIPPED | OUTPATIENT
Start: 2021-05-14 | End: 2022-05-18 | Stop reason: SDUPTHER

## 2021-05-14 NOTE — PROGRESS NOTES
5/14/2021    Assessment/Plan      Diagnoses and all orders for this visit:    Postoperative hypothyroidism  -     TSH, 3rd generation; Future  -     T4, free; Future  -     TSH, 3rd generation  -     T4, free    Hypoparathyroidism after procedure ([address] )  -     Comprehensive metabolic panel; Future  -     PTH, intact; Future  -     Vitamin D 25 hydroxy; Future  -     Comprehensive metabolic panel  -     PTH, intact  -     Vitamin D 25 hydroxy        Assessment/Plan:    1  Hypothyroidism:  I do not have labs from 05/12 yet  We will call her when the results are in  We will plan for labs in September prior to when she leaves for vacation  We will call her with these results as well  2  Hypoparathyroidism:  Clinically feeling well  Will call her with the results of recent lab work  CC: Follow-up    History of Present Illness     HPI: Janneth Jimenez is a 36y o  year old female with history ofHypothyroidism after thyroid surgery for treatment of Graves disease who presents for a follow-up appointment  She also has postsurgical hypoparathyroidism  She continues on Synthroid 200 mcg daily  For hypoparathyroidism she is on calcitriol 1 mcg daily as well as calcium 600 mg twice a day as well as hydrochlorothiazide 25 mg daily  She presents today overall feeling well  She reports due to lifestyle measures is down about 20 lb  I did provide [facility] contact information  Overall feeling well without any numbness, tingling, paresthesias, muscle cramping  Review of Systems   Constitutional: Negative for fatigue  HENT: Negative for trouble swallowing and voice change  Eyes: Negative for visual disturbance  Respiratory: Negative for shortness of breath  Cardiovascular: Negative for palpitations and leg swelling  Gastrointestinal: Negative for abdominal pain, nausea and vomiting  Endocrine: Negative for polydipsia and polyuria     Musculoskeletal: Negative for arthralgias and myalgias  Skin: Negative for rash  Neurological: Negative for dizziness, tremors and weakness  Hematological: Negative for adenopathy  Psychiatric/Behavioral: Negative for agitation and confusion  Historical Information   History reviewed  No pertinent past medical history  History reviewed  No pertinent surgical history  Social History   Social History     Substance and Sexual Activity   Alcohol Use None     Social History     Substance and Sexual Activity   Drug Use Not on file     Social History     Tobacco Use   Smoking Status Former Smoker    Packs/day: 1 00    Types: Cigarettes    Quit date:     Years since quittin 3   Smokeless Tobacco Never Used     Family History:   Family History   Problem Relation Age of Onset    Hypertension Mother     Obesity Mother     Hyperlipidemia Mother     Diabetes unspecified Father     Arthritis Father     Hypertension Father     Cancer Maternal Grandmother     Heart disease Paternal Grandmother     Heart disease Paternal Grandfather     Dementia Maternal Grandfather        Meds/Allergies   Current Outpatient Medications   Medication Sig Dispense Refill    calcitriol (ROCALTROL) 0 5 MCG capsule Take 2 capsules (1 mcg total) by mouth daily 60 capsule 11    Calcium Carbonate (CVS CALCIUM-600 PO) Take 1,200 mg by mouth      hydrochlorothiazide (HYDRODIURIL) 25 mg tablet Take 1 tablet (25 mg total) by mouth daily 60 tablet 5    Potassium Chloride ER 20 MEQ TBCR Take 2 tablets (40 mEq total) by mouth 2 (two) times a day 120 tablet 11    Synthroid 200 MCG tablet take 1 tablet by mouth once daily 30 tablet 3     No current facility-administered medications for this visit  Allergies   Allergen Reactions    Amoxicillin-Pot Clavulanate Other (See Comments)       Objective   Vitals: Blood pressure 122/84, pulse 97, height 5' 7" (1 702 m), weight 100 kg (220 lb 6 4 oz)    Invasive Devices     None                 Physical Exam  Vitals signs reviewed  Constitutional:       General: She is not in acute distress  Appearance: She is well-developed  She is not diaphoretic  HENT:      Head: Normocephalic and atraumatic  Eyes:      Conjunctiva/sclera: Conjunctivae normal       Pupils: Pupils are equal, round, and reactive to light  Neck:      Musculoskeletal: Normal range of motion and neck supple  Thyroid: No thyromegaly  Cardiovascular:      Rate and Rhythm: Normal rate and regular rhythm  Pulmonary:      Effort: Pulmonary effort is normal  No respiratory distress  Breath sounds: Normal breath sounds  Abdominal:      General: Bowel sounds are normal       Palpations: Abdomen is soft  Musculoskeletal: Normal range of motion  Skin:     General: Skin is warm and dry  Findings: No rash  Neurological:      Mental Status: She is alert and oriented to person, place, and time  Motor: No abnormal muscle tone  Psychiatric:         Behavior: Behavior normal          The history was obtained from the review of the chart and from the patient      Lab Results:      Recent Results (from the past 53508 hour(s))   Comprehensive metabolic panel Lab Collect    Collection Time: 04/06/20  7:29 AM   Result Value Ref Range    Glucose, Random 91 65 - 99 mg/dL    BUN 18 6 - 20 mg/dL    Creatinine 1 08 (H) 0 57 - 1 00 mg/dL    eGFR Non African American 65 >59 mL/min/1 73    eGFR  75 >59 mL/min/1 73    SL AMB BUN/CREATININE RATIO 17 9 - 23    Sodium 141 134 - 144 mmol/L    Potassium 3 8 3 5 - 5 2 mmol/L    Chloride 96 96 - 106 mmol/L    CO2 28 20 - 29 mmol/L    CALCIUM 9 1 8 7 - 10 2 mg/dL    Protein, Total 6 8 6 0 - 8 5 g/dL    Albumin 4 3 3 8 - 4 8 g/dL    Globulin, Total 2 5 1 5 - 4 5 g/dL    Albumin/Globulin Ratio 1 7 1 2 - 2 2    TOTAL BILIRUBIN 0 3 0 0 - 1 2 mg/dL    Alk Phos Isoenzymes 68 39 - 117 IU/L    AST 54 (H) 0 - 40 IU/L    ALT 64 (H) 0 - 32 IU/L   TSH, 3rd generation Lab Collect    Collection Time: 04/06/20 7:29 AM   Result Value Ref Range    TSH 6 000 (H) 0 450 - 4 500 uIU/mL   T4, free Lab Collect    Collection Time: 04/06/20  7:29 AM   Result Value Ref Range    Free t4 1 44 0 82 - 1 77 ng/dL   TSH, 3rd generation    Collection Time: 11/04/20  6:44 AM   Result Value Ref Range    TSH 9 740 (H) 0 450 - 4 500 uIU/mL   T4, free    Collection Time: 11/04/20  6:44 AM   Result Value Ref Range    Free t4 1 52 0 82 - 1 77 ng/dL   Comprehensive metabolic panel Lab Collect    Collection Time: 11/04/20  6:44 AM   Result Value Ref Range    Glucose, Random 97 65 - 99 mg/dL    BUN 13 6 - 20 mg/dL    Creatinine 1 02 (H) 0 57 - 1 00 mg/dL    eGFR Non  69 >59 mL/min/1 73    eGFR  80 >59 mL/min/1 73    SL AMB BUN/CREATININE RATIO 13 9 - 23    Sodium 137 134 - 144 mmol/L    Potassium 3 8 3 5 - 5 2 mmol/L    Chloride 96 96 - 106 mmol/L    CO2 28 20 - 29 mmol/L    CALCIUM 9 2 8 7 - 10 2 mg/dL    Protein, Total 7 1 6 0 - 8 5 g/dL    Albumin 4 3 3 8 - 4 8 g/dL    Globulin, Total 2 8 1 5 - 4 5 g/dL    Albumin/Globulin Ratio 1 5 1 2 - 2 2    TOTAL BILIRUBIN 0 5 0 0 - 1 2 mg/dL    Alk Phos Isoenzymes 73 39 - 117 IU/L    AST 41 (H) 0 - 40 IU/L    ALT 46 (H) 0 - 32 IU/L   Basic metabolic panel Lab Collect    Collection Time: 05/12/21  6:40 AM   Result Value Ref Range    Glucose, Random 92 65 - 99 mg/dL    BUN 10 6 - 24 mg/dL    Creatinine 0 91 0 57 - 1 00 mg/dL    eGFR Non  79 >59 mL/min/1 73    eGFR  91 >59 mL/min/1 73    SL AMB BUN/CREATININE RATIO 11 9 - 23    Sodium 142 134 - 144 mmol/L    Potassium 3 6 3 5 - 5 2 mmol/L    Chloride 99 96 - 106 mmol/L    CO2 28 20 - 29 mmol/L    CALCIUM 8 6 (L) 8 7 - 10 2 mg/dL         No future appointments  Portions of the record may have been created with voice recognition software  Occasional wrong word or "sound a like" substitutions may have occurred due to the inherent limitations of voice recognition software   Read the chart carefully and recognize, using context, where substitutions have occurred

## 2021-05-17 ENCOUNTER — TELEPHONE (OUTPATIENT)
Dept: ENDOCRINOLOGY | Facility: HOSPITAL | Age: 41
End: 2021-05-17

## 2021-05-17 DIAGNOSIS — E89.2 HYPOPARATHYROIDISM AFTER PROCEDURE (HCC): ICD-10-CM

## 2021-05-17 DIAGNOSIS — E89.0 POSTOPERATIVE HYPOTHYROIDISM: Primary | ICD-10-CM

## 2021-05-17 NOTE — TELEPHONE ENCOUNTER
Received recent lab work done on 05/12/2021  Thyroid levels appear to be slightly high in blood work though not enough to consider a dose reduction  Instead, I would suggest repeating labs in about 2 months to trend closely  We can call her with these results as well      Labs from [facility] 5/12/2021:   white blood cells 9 4, hemoglobin 14, platelets 165, glucose 90, BUN 10, creatinine 0 9, sodium 142, potassium 3 7, calcium 8 5, albumin 4 4, total cholesterol 222, , TSH 0 318, free T4 1 52

## 2021-11-22 ENCOUNTER — OFFICE VISIT (OUTPATIENT)
Dept: ENDOCRINOLOGY | Facility: HOSPITAL | Age: 41
End: 2021-11-22
Payer: COMMERCIAL

## 2021-11-22 VITALS
BODY MASS INDEX: 36.1 KG/M2 | DIASTOLIC BLOOD PRESSURE: 80 MMHG | HEIGHT: 67 IN | SYSTOLIC BLOOD PRESSURE: 138 MMHG | WEIGHT: 230 LBS | HEART RATE: 108 BPM

## 2021-11-22 DIAGNOSIS — E89.2 HYPOPARATHYROIDISM AFTER PROCEDURE (HCC): ICD-10-CM

## 2021-11-22 DIAGNOSIS — R73.03 PREDIABETES: ICD-10-CM

## 2021-11-22 DIAGNOSIS — E89.0 POSTOPERATIVE HYPOTHYROIDISM: Primary | ICD-10-CM

## 2021-11-22 PROCEDURE — 99214 OFFICE O/P EST MOD 30 MIN: CPT | Performed by: INTERNAL MEDICINE

## 2021-11-23 LAB
EST. AVERAGE GLUCOSE BLD GHB EST-MCNC: 120 MG/DL
HBA1C MFR BLD: 5.8 % (ref 4.8–5.6)

## 2021-11-24 LAB
25(OH)D3+25(OH)D2 SERPL-MCNC: 36.5 NG/ML (ref 30–100)
ALBUMIN SERPL-MCNC: 4.3 G/DL (ref 3.8–4.8)
ALBUMIN/GLOB SERPL: 1.7 {RATIO} (ref 1.2–2.2)
ALP SERPL-CCNC: 67 IU/L (ref 44–121)
ALT SERPL-CCNC: 26 IU/L (ref 0–32)
AST SERPL-CCNC: 26 IU/L (ref 0–40)
BILIRUB SERPL-MCNC: 0.3 MG/DL (ref 0–1.2)
BUN SERPL-MCNC: 13 MG/DL (ref 6–24)
BUN/CREAT SERPL: 14 (ref 9–23)
CALCIUM SERPL-MCNC: 8.5 MG/DL (ref 8.7–10.2)
CHLORIDE SERPL-SCNC: 101 MMOL/L (ref 96–106)
CO2 SERPL-SCNC: 26 MMOL/L (ref 20–29)
CREAT SERPL-MCNC: 0.92 MG/DL (ref 0.57–1)
GLOBULIN SER-MCNC: 2.5 G/DL (ref 1.5–4.5)
GLUCOSE SERPL-MCNC: 98 MG/DL (ref 65–99)
POTASSIUM SERPL-SCNC: 4.1 MMOL/L (ref 3.5–5.2)
PROT SERPL-MCNC: 6.8 G/DL (ref 6–8.5)
PTH-INTACT SERPL-MCNC: 8 PG/ML (ref 15–65)
SL AMB EGFR AFRICAN AMERICAN: 90 ML/MIN/1.73
SL AMB EGFR NON AFRICAN AMERICAN: 78 ML/MIN/1.73
SODIUM SERPL-SCNC: 141 MMOL/L (ref 134–144)
T4 FREE SERPL-MCNC: 1.81 NG/DL (ref 0.82–1.77)
TSH SERPL DL<=0.005 MIU/L-ACNC: 1.87 UIU/ML (ref 0.45–4.5)

## 2022-01-12 DIAGNOSIS — I10 HYPERTENSION, UNSPECIFIED TYPE: ICD-10-CM

## 2022-01-12 RX ORDER — POTASSIUM CHLORIDE 1500 MG/1
TABLET, FILM COATED, EXTENDED RELEASE ORAL
Qty: 120 TABLET | Refills: 11 | Status: SHIPPED | OUTPATIENT
Start: 2022-01-12

## 2022-05-18 DIAGNOSIS — I10 HYPERTENSION, UNSPECIFIED TYPE: ICD-10-CM

## 2022-05-18 DIAGNOSIS — E89.0 POSTOPERATIVE HYPOTHYROIDISM: ICD-10-CM

## 2022-05-18 RX ORDER — LEVOTHYROXINE SODIUM 200 MCG
TABLET ORAL
Qty: 30 TABLET | Refills: 5 | Status: SHIPPED | OUTPATIENT
Start: 2022-05-18

## 2022-05-18 RX ORDER — HYDROCHLOROTHIAZIDE 25 MG/1
25 TABLET ORAL DAILY
Qty: 30 TABLET | Refills: 5 | Status: SHIPPED | OUTPATIENT
Start: 2022-05-18

## 2022-06-21 ENCOUNTER — OFFICE VISIT (OUTPATIENT)
Dept: ENDOCRINOLOGY | Facility: HOSPITAL | Age: 42
End: 2022-06-21
Payer: COMMERCIAL

## 2022-06-21 VITALS
HEIGHT: 67 IN | WEIGHT: 232.6 LBS | SYSTOLIC BLOOD PRESSURE: 128 MMHG | HEART RATE: 100 BPM | BODY MASS INDEX: 36.51 KG/M2 | DIASTOLIC BLOOD PRESSURE: 80 MMHG

## 2022-06-21 DIAGNOSIS — R73.03 PREDIABETES: ICD-10-CM

## 2022-06-21 DIAGNOSIS — Z86.39 HISTORY OF GRAVES' DISEASE: ICD-10-CM

## 2022-06-21 DIAGNOSIS — E89.2 HYPOPARATHYROIDISM AFTER PROCEDURE (HCC): ICD-10-CM

## 2022-06-21 DIAGNOSIS — E89.0 POSTOPERATIVE HYPOTHYROIDISM: Primary | ICD-10-CM

## 2022-06-21 PROCEDURE — 99214 OFFICE O/P EST MOD 30 MIN: CPT | Performed by: PHYSICIAN ASSISTANT

## 2022-06-21 NOTE — PROGRESS NOTES
Rosa Waldrop 39 y o  female MRN: [id number]    Encounter: [id number]      Assessment/Plan     Assessment: This is a 39y o -year-old female with hypothyroidism, hypoparathyroidism, and pre diabetes  Plan:  1  Hypothyroidism:  No lab work completed prior to today's office visit  At this time she is doing well with no symptoms of hypothyroidism or hyperthyroidism  She will continue with Synthroid 200 mcg daily  She will repeat lab work at earliest convenience  We will contact once results are in  At this time she will follow-up in 6 months with lab work completed prior to visit  2  Hypoparathyroidism:  No lab work completed prior to today's office visit  No symptoms of hypocalcemia  She will get lab work completed at earliest convenience  Continue with calcitriol 1 mcg daily and calcium supplement 1200 mg daily  3  Pre diabetes:  Most recent hemoglobin A1c about 7 months ago was 5 8  Discussed management of pre diabetes and course of action  Will continue monitor over time  CC:  Hypothyroidism and hypoparathyroidism follow-up    History of Present Illness     HPI:  Rosa Waldrop is a 39year old female with hypothyroidism after thyroid surgery for treatment of Graves disease who presents for a follow-up appointment  She also has postprocedural hypoparathyroidism  For hypothyroidism she continues on Synthroid 200 mcg daily  For hypoparathyroidism she is maintained on calcitriol 1 mcg daily as well as calcium 600 mg twice a day and hydrochlorothiazide 25 mg daily  She recently was on vacation out [employer], and lost her medication with check on luggage  She was without it for few days, because of this was hesitant to get her lab work completed  She presents today overall feeling okay  Continues to have concerns with her weight, but believes some of it is due to poor eating habits such as boredom eating    Denies any increasing fatigue, heat or cold intolerance, palpitations, abdominal pain, diarrhea, tremors, sweating, muscle aches or weakness  Review of Systems   Constitutional: Negative for activity change, appetite change, diaphoresis, fatigue and unexpected weight change  HENT: Negative for sore throat, trouble swallowing and voice change  Eyes: Negative for visual disturbance  Respiratory: Negative for chest tightness and shortness of breath  Cardiovascular: Negative for chest pain, palpitations and leg swelling  Gastrointestinal: Negative for abdominal pain, constipation and diarrhea  Endocrine: Negative for cold intolerance, heat intolerance, polydipsia, polyphagia and polyuria  Skin: Negative for rash  Neurological: Negative for dizziness, tremors, light-headedness, numbness and headaches  Hematological: Negative for adenopathy  Psychiatric/Behavioral: Negative for dysphoric mood and sleep disturbance  The patient is not nervous/anxious  Historical Information   No past medical history on file  No past surgical history on file    Social History   Social History     Substance and Sexual Activity   Alcohol Use None     Social History     Substance and Sexual Activity   Drug Use Not on file     Social History     Tobacco Use   Smoking Status Former Smoker    Packs/day: 1 00    Types: Cigarettes    Quit date:     Years since quittin 4   Smokeless Tobacco Never Used     Family History:   Family History   Problem Relation Age of Onset    Hypertension Mother     Obesity Mother     Hyperlipidemia Mother     Diabetes unspecified Father     Arthritis Father     Hypertension Father     Cancer Maternal Grandmother     Heart disease Paternal Grandmother     Heart disease Paternal Grandfather     Dementia Maternal Grandfather        Meds/Allergies   Current Outpatient Medications   Medication Sig Dispense Refill    calcitriol (ROCALTROL) 0 5 MCG capsule take 2 capsules by mouth daily 60 capsule 11    Calcium Carbonate ([facility]S CALCIUM-600 PO) Take 1,200 mg by mouth      hydrochlorothiazide (HYDRODIURIL) 25 mg tablet Take 1 tablet (25 mg total) by mouth in the morning  30 tablet 5    Potassium Chloride ER 20 MEQ TBCR take 2 tablets by mouth twice a day 120 tablet 11    Synthroid 200 MCG tablet take 1 tablet by mouth once daily 30 tablet 5     No current facility-administered medications for this visit  Allergies   Allergen Reactions    Amoxicillin-Pot Clavulanate Other (See Comments)       Objective   Vitals: Blood pressure 128/80, pulse 100, height 5' 7" (1 702 m), weight 106 kg (232 lb 9 6 oz)  Physical Exam  Vitals and nursing note reviewed  Constitutional:       General: She is not in acute distress  Appearance: Normal appearance  She is not diaphoretic  HENT:      Head: Normocephalic and atraumatic  Eyes:      General: No scleral icterus  Extraocular Movements: Extraocular movements intact  Conjunctiva/sclera: Conjunctivae normal       Pupils: Pupils are equal, round, and reactive to light  Neck:      Comments: Thyroid surgically removed  Cardiovascular:      Rate and Rhythm: Normal rate and regular rhythm  Heart sounds: No murmur heard  Pulmonary:      Effort: Pulmonary effort is normal  No respiratory distress  Breath sounds: Normal breath sounds  No wheezing  Musculoskeletal:      Cervical back: Normal range of motion  Right lower leg: No edema  Left lower leg: No edema  Lymphadenopathy:      Cervical: No cervical adenopathy  Neurological:      Mental Status: She is alert and oriented to person, place, and time  Mental status is at baseline  Sensory: No sensory deficit  Gait: Gait normal    Psychiatric:         Mood and Affect: Mood normal          Behavior: Behavior normal          Thought Content: Thought content normal          The history was obtained from the review of the chart, patient      Lab Results:   Lab Results   Component Value Date/Time    Free t4 1 81 (H) 11/23/2021 06:50 AM Portions of the record may have been created with voice recognition software  Occasional wrong word or "sound a like" substitutions may have occurred due to the inherent limitations of voice recognition software  Read the chart carefully and recognize, using context, where substitutions have occurred

## 2022-06-21 NOTE — PATIENT INSTRUCTIONS
Continue Synthroid 200 mcg daily  Continue calcitriol 1 mcg daily and calcium 600 mg twice a day  Get lab work completed  Contact the office with any concerns or questions, or any change in symptoms  Follow-up in 6 months with lab work completed prior to visit

## 2022-08-27 LAB
25(OH)D3+25(OH)D2 SERPL-MCNC: 42.4 NG/ML (ref 30–100)
ALBUMIN SERPL-MCNC: 4.4 G/DL (ref 3.8–4.8)
ALBUMIN/GLOB SERPL: 1.6 {RATIO} (ref 1.2–2.2)
ALP SERPL-CCNC: 73 IU/L (ref 44–121)
ALT SERPL-CCNC: 27 IU/L (ref 0–32)
AST SERPL-CCNC: 28 IU/L (ref 0–40)
BILIRUB SERPL-MCNC: 0.3 MG/DL (ref 0–1.2)
BUN SERPL-MCNC: 14 MG/DL (ref 6–24)
BUN/CREAT SERPL: 14 (ref 9–23)
CALCIUM SERPL-MCNC: 8.8 MG/DL (ref 8.7–10.2)
CHLORIDE SERPL-SCNC: 98 MMOL/L (ref 96–106)
CO2 SERPL-SCNC: 28 MMOL/L (ref 20–29)
CREAT SERPL-MCNC: 1 MG/DL (ref 0.57–1)
EGFR: 73 ML/MIN/1.73
GLOBULIN SER-MCNC: 2.8 G/DL (ref 1.5–4.5)
GLUCOSE SERPL-MCNC: 107 MG/DL (ref 65–99)
POTASSIUM SERPL-SCNC: 3.9 MMOL/L (ref 3.5–5.2)
PROT SERPL-MCNC: 7.2 G/DL (ref 6–8.5)
PTH-INTACT SERPL-MCNC: 7 PG/ML (ref 15–65)
SODIUM SERPL-SCNC: 140 MMOL/L (ref 134–144)
T4 FREE SERPL-MCNC: 1.52 NG/DL (ref 0.82–1.77)
TSH SERPL DL<=0.005 MIU/L-ACNC: 4.57 UIU/ML (ref 0.45–4.5)

## 2022-08-31 DIAGNOSIS — R73.03 PREDIABETES: ICD-10-CM

## 2022-08-31 DIAGNOSIS — E89.0 POSTOPERATIVE HYPOTHYROIDISM: Primary | ICD-10-CM

## 2022-08-31 DIAGNOSIS — E89.2 HYPOPARATHYROIDISM AFTER PROCEDURE (HCC): ICD-10-CM

## 2022-10-19 NOTE — PROGRESS NOTES
12/6/2018    Assessment/Plan      Diagnoses and all orders for this visit:    Hypothyroidism, unspecified type  -     SYNTHROID 175 MCG tablet; 1 tab daily  BRAND NECESSARY  -     TSH, 3rd generation Lab Collect; Future  -     T4, free Lab Collect; Future  -     TSH, 3rd generation Lab Collect  -     T4, free Lab Collect    Hypoparathyroidism after procedure (HCC)  -     calcitriol (ROCALTROL) 0 5 MCG capsule; Take 2 capsules (1 mcg total) by mouth daily  -     Comprehensive metabolic panel Lab Collect; Future  -     Comprehensive metabolic panel Lab Collect  -     Basic metabolic panel Lab Collect; Future  -     Basic metabolic panel Lab Collect    Hypertension, unspecified type  -     losartan-hydrochlorothiazide (HYZAAR) 50-12 5 mg per tablet; Take 1 tablet by mouth daily  -     hydrochlorothiazide (HYDRODIURIL) 25 mg tablet; Take 1 tablet (25 mg total) by mouth 2 (two) times a day  -     Potassium Chloride ER 20 MEQ TBCR; Take 2 tablets (40 mEq total) by mouth 2 (two) times a day    History of Graves' disease        Assessment/Plan:  1  Hypothyroidism postoperative:  Clinically and biochemically euthyroid on current Synthroid regimen  Continue current dose and see her back in 6 months with labs just prior  2   Hypoparathyroidism:  Calcium was low in recent blood work, but she does need a refill of her calcitriol  Continue current calcitriol dose and calcium  We will be decreasing her hydrochlorothiazide dose slightly so I have asked her to go for a repeat BMP 1 week after resuming her calcitriol and cutting her hydrochlorothiazide dose to 25 mg just once a day  She will continue the losartan-hydrochlorothiazide 50-12 5 mg daily as well  We will call her with these results  Plan to repeat CMP before next appointment      3   Hypertension:  I suggested that she get a blood pressure monitor at home to monitor blood pressure since we are reducing her hydrochlorothiazide dose of 25 mg daily and continuing Pt ID confirmed    Weight Loss Metrics 3/19/2020 12/26/2019 12/26/2019 4/16/2019 4/3/2019 12/6/2016 9/13/2016   Pre op / Initial Wt - 289.6 - - - - -   Today's Wt 284 lb - 289 lb 9.6 oz 270 lb 1 oz 275 lb 284 lb 287 lb 14.7 oz   BMI 48.75 kg/m2 - 49.71 kg/m2 44.94 kg/m2 45.76 kg/m2 47.26 kg/m2 47.91 kg/m2   Ideal Body Wt - 131 - - - - -   Excess Body Wt - 158.6 - - - - -   Wt loss to date - 0 - - - - -   % Wt Loss - 0 - - - - -   80% EBW - 126.88 - - - - -       Body mass index is 48.75 kg/m². her current losartan-hydrochlorothiazide combination dose  She had a normal IGF-1 level as well as a normal 1 mg overnight dexamethasone suppression test   Aldosterone and renin levels are still pending  Will call her with these results  4   Impaired fasting glucose:  Encourage diet, exercise, lifestyle, weight loss modifications  Will monitor this over time  CC: Hypothyroidism and hypoparathyroidism follow up    History of Present Illness     HPI: Keira Katz is a 40y o  year old female with history of Graves disease status post thyroidectomy with resultant hypothyroidism as well as complicated by postsurgical hypoparathyroidism who presents for follow-up appointment  Overall she is feeling well without any significant new symptoms or changes to her health or medications  She is on Synthroid brand 175 mcg daily  Takes medication appropriately  For hypoparathyroidism she is on calcitriol 0 5 mcg tablets in takes 2 tablets daily as well as 1200 mg of calcium daily  She also has a history of hypertension managed on losartan-hydrochlorothiazide 50-12 5 mg daily  She also takes a separate hydrochlorothiazide 25 mg tablet  She is on potassium 40 mEq twice a day as well  She presents today for a follow-up appointment overall feels well  She has noticed that she has gained some weight recently and will be keeping a close eye on this  She has been out of her calcitriol for a few days which explains her low calcium in her recent blood work  She has had some more numbness and tingling in her face recently which she does not usually have she is on the calcitriol  Otherwise she feels well and denies any symptoms related bowl to hypothyroidism or hyperthyroidism  Review of Systems   Constitutional: Negative for fatigue  HENT: Negative for trouble swallowing and voice change  Eyes: Negative for visual disturbance  Respiratory: Negative for shortness of breath      Cardiovascular: Negative for palpitations and leg swelling  Gastrointestinal: Negative for abdominal pain, nausea and vomiting  Endocrine: Negative for cold intolerance, heat intolerance, polydipsia and polyuria  Musculoskeletal: Negative for arthralgias and myalgias  Skin: Negative for rash  Neurological: Negative for dizziness, tremors and weakness  Hematological: Negative for adenopathy  Psychiatric/Behavioral: Negative for agitation and confusion  Historical Information   No past medical history on file  No past surgical history on file  Social History   History   Alcohol use Not on file     History   Drug use: Unknown     History   Smoking Status    Former Smoker    Packs/day: 1 00    Types: Cigarettes    Quit date: 2009   Smokeless Tobacco    Never Used     Family History:   Family History   Problem Relation Age of Onset    Hypertension Mother     Obesity Mother     Hyperlipidemia Mother     Diabetes unspecified Father     Arthritis Father     Hypertension Father     Cancer Maternal Grandmother     Heart disease Paternal Grandmother     Heart disease Paternal Grandfather     Dementia Maternal Grandfather        Meds/Allergies   Current Outpatient Prescriptions   Medication Sig Dispense Refill    calcitriol (ROCALTROL) 0 5 MCG capsule Take 2 capsules (1 mcg total) by mouth daily 30 capsule 11    Calcium Carbonate (CVS CALCIUM-600 PO) Take 1,200 mg by mouth      hydrochlorothiazide (HYDRODIURIL) 25 mg tablet Take 1 tablet (25 mg total) by mouth 2 (two) times a day 60 tablet 11    losartan-hydrochlorothiazide (HYZAAR) 50-12 5 mg per tablet Take 1 tablet by mouth daily 30 tablet 11    Potassium Chloride ER 20 MEQ TBCR Take 2 tablets (40 mEq total) by mouth 2 (two) times a day 120 tablet 11    SYNTHROID 175 MCG tablet 1 tab daily  BRAND NECESSARY  30 tablet 11     No current facility-administered medications for this visit        No Known Allergies    Objective   Vitals: Blood pressure 110/80, pulse 92, height 5' 7" (1 702 m), weight 99 9 kg (220 lb 4 8 oz)  Invasive Devices          No matching active lines, drains, or airways          Physical Exam   Constitutional: She is oriented to person, place, and time  She appears well-developed and well-nourished  No distress  HENT:   Head: Normocephalic and atraumatic  Eyes: Pupils are equal, round, and reactive to light  Conjunctivae are normal    Neck: Normal range of motion  Neck supple  No thyromegaly present  Cardiovascular: Normal rate and regular rhythm  Pulmonary/Chest: Effort normal and breath sounds normal  No respiratory distress  Abdominal: Soft  Bowel sounds are normal  She exhibits no distension  Musculoskeletal: Normal range of motion  She exhibits no edema  Neurological: She is alert and oriented to person, place, and time  She exhibits normal muscle tone  Skin: Skin is warm and dry  No rash noted  She is not diaphoretic  Psychiatric: She has a normal mood and affect  Her behavior is normal    Vitals reviewed  The history was obtained from the review of the chart and from the patient  Lab Results:      Labs from [address] [place] [address] on 12/04/2018:  Glucose 114, BUN 16, creatinine 0 84, GFR 89, sodium 142, potassium 4 1, calcium 8 5, albumin 4 5, bilirubin 0 2, alk phos 65, AST 39, ALT 54, free T4 1 3, TSH 2 22, IGF-1 101, PTH 10, a m  cortisol after 1 mg dexamethasone 0 7  No future appointments  Portions of the record may have been created with voice recognition software  Occasional wrong word or "sound a like" substitutions may have occurred due to the inherent limitations of voice recognition software  Read the chart carefully and recognize, using context, where substitutions have occurred  The patient is a 72y Male complaining of shortness of breath.

## 2022-11-23 DIAGNOSIS — I10 HYPERTENSION, UNSPECIFIED TYPE: ICD-10-CM

## 2022-11-23 DIAGNOSIS — E89.0 POSTOPERATIVE HYPOTHYROIDISM: ICD-10-CM

## 2022-11-23 RX ORDER — LEVOTHYROXINE SODIUM 200 MCG
TABLET ORAL
Qty: 30 TABLET | Refills: 5 | Status: SHIPPED | OUTPATIENT
Start: 2022-11-23

## 2022-11-23 RX ORDER — HYDROCHLOROTHIAZIDE 25 MG/1
TABLET ORAL
Qty: 30 TABLET | Refills: 5 | Status: SHIPPED | OUTPATIENT
Start: 2022-11-23

## 2022-11-27 LAB
ALBUMIN SERPL-MCNC: 4.3 G/DL (ref 3.8–4.8)
ALBUMIN/GLOB SERPL: 1.7 {RATIO} (ref 1.2–2.2)
ALP SERPL-CCNC: 74 IU/L (ref 44–121)
ALT SERPL-CCNC: 36 IU/L (ref 0–32)
AST SERPL-CCNC: 32 IU/L (ref 0–40)
BILIRUB SERPL-MCNC: 0.2 MG/DL (ref 0–1.2)
BUN SERPL-MCNC: 14 MG/DL (ref 6–24)
BUN/CREAT SERPL: 17 (ref 9–23)
CALCIUM SERPL-MCNC: 7.2 MG/DL (ref 8.7–10.2)
CHLORIDE SERPL-SCNC: 100 MMOL/L (ref 96–106)
CO2 SERPL-SCNC: 32 MMOL/L (ref 20–29)
CREAT SERPL-MCNC: 0.82 MG/DL (ref 0.57–1)
EGFR: 92 ML/MIN/1.73
GLOBULIN SER-MCNC: 2.5 G/DL (ref 1.5–4.5)
GLUCOSE SERPL-MCNC: 97 MG/DL (ref 70–99)
HBA1C MFR BLD: 6.1 % (ref 4.8–5.6)
POTASSIUM SERPL-SCNC: 4 MMOL/L (ref 3.5–5.2)
PROT SERPL-MCNC: 6.8 G/DL (ref 6–8.5)
SODIUM SERPL-SCNC: 144 MMOL/L (ref 134–144)
T4 FREE SERPL-MCNC: 1.56 NG/DL (ref 0.82–1.77)
TSH SERPL DL<=0.005 MIU/L-ACNC: 1.26 UIU/ML (ref 0.45–4.5)

## 2022-12-21 ENCOUNTER — OFFICE VISIT (OUTPATIENT)
Dept: ENDOCRINOLOGY | Facility: HOSPITAL | Age: 42
End: 2022-12-21

## 2022-12-21 VITALS
HEIGHT: 67 IN | BODY MASS INDEX: 38.86 KG/M2 | WEIGHT: 247.6 LBS | HEART RATE: 100 BPM | DIASTOLIC BLOOD PRESSURE: 80 MMHG | SYSTOLIC BLOOD PRESSURE: 126 MMHG

## 2022-12-21 DIAGNOSIS — E89.0 POSTOPERATIVE HYPOTHYROIDISM: Primary | ICD-10-CM

## 2022-12-21 DIAGNOSIS — Z86.39 HISTORY OF GRAVES' DISEASE: ICD-10-CM

## 2022-12-21 DIAGNOSIS — E89.2 HYPOPARATHYROIDISM AFTER PROCEDURE (HCC): ICD-10-CM

## 2022-12-21 DIAGNOSIS — R73.03 PREDIABETES: ICD-10-CM

## 2022-12-21 LAB
25(OH)D3+25(OH)D2 SERPL-MCNC: 40 NG/ML (ref 30–100)
ALBUMIN SERPL-MCNC: 4.6 G/DL (ref 3.8–4.8)
ALBUMIN/GLOB SERPL: 1.6 {RATIO} (ref 1.2–2.2)
ALP SERPL-CCNC: 80 IU/L (ref 44–121)
ALT SERPL-CCNC: 26 IU/L (ref 0–32)
AST SERPL-CCNC: 25 IU/L (ref 0–40)
BILIRUB SERPL-MCNC: <0.2 MG/DL (ref 0–1.2)
BUN SERPL-MCNC: 13 MG/DL (ref 6–24)
BUN/CREAT SERPL: 15 (ref 9–23)
CALCIUM SERPL-MCNC: 8.2 MG/DL (ref 8.7–10.2)
CHLORIDE SERPL-SCNC: 100 MMOL/L (ref 96–106)
CO2 SERPL-SCNC: 29 MMOL/L (ref 20–29)
CREAT SERPL-MCNC: 0.88 MG/DL (ref 0.57–1)
EGFR: 84 ML/MIN/1.73
EST. AVERAGE GLUCOSE BLD GHB EST-MCNC: 131 MG/DL
GLOBULIN SER-MCNC: 2.9 G/DL (ref 1.5–4.5)
GLUCOSE SERPL-MCNC: 126 MG/DL (ref 70–99)
HBA1C MFR BLD: 6.2 % (ref 4.8–5.6)
POTASSIUM SERPL-SCNC: 4.2 MMOL/L (ref 3.5–5.2)
PROT SERPL-MCNC: 7.5 G/DL (ref 6–8.5)
PTH-INTACT SERPL-MCNC: 7 PG/ML (ref 15–65)
SODIUM SERPL-SCNC: 144 MMOL/L (ref 134–144)
T4 FREE SERPL-MCNC: 1.61 NG/DL (ref 0.82–1.77)
TSH SERPL DL<=0.005 MIU/L-ACNC: 0.5 UIU/ML (ref 0.45–4.5)

## 2022-12-21 NOTE — PATIENT INSTRUCTIONS
Continue Synthroid 200 mcg daily  Continue calcitriol 1 mcg daily and calcium 600 mg twice a day  Get lab work in 1 month  Contact the office with any concerns or questions, or any change in symptoms  Follow-up in 6 months with lab work completed prior to visit

## 2022-12-21 NOTE — PROGRESS NOTES
Reg Joshua 43 y o  female MRN: [id number]    Encounter: [id number]      Assessment/Plan     Assessment: This is a 43y o -year-old female with hypothyroidism, hypoparathyroidism, and prediabetes  Plan:  1  Hypothyroidism:  Most recent thyroid lab work came back normal   Clinically and biochemically euthyroid  At this time she will continue with Synthroid 200 mcg daily  Contact the office if there is any change in symptoms  Follow-up in 6 months with lab work completed prior to visit      2  Hypoparathyroidism: Most recent calcium levels were slightly low even with her history of hypoparathyroidism  This may be due to recent illness as lab work done prior to her illness were fine  At this time since she does not have any symptoms we will repeat lab work in 1 month to see if adjustments to her calcitriol or calcium need to be made  Contact the office if there is any change in symptoms      3  Pre diabetes: Recent hemoglobin A1c has increased to 6 2  Given with recent illness, this could be a contributing factor into why her A1c is higher  Does admit to some poor dietary decisions and lack of exercise which may also be contributing her glucose levels  Discussed possibly starting metformin if needed to help improve glucose levels, and may also help with weight  She will try to make lifestyle changes to help improve glucose levels  CC: Hypothyroidism and hypoparathyroidism follow-up    History of Present Illness     HPI:  Chelsea Mohr is a 43year old female with hypothyroidism after thyroid surgery for treatment of Graves disease who presents for a follow-up appointment  Divya Mike also has postprocedural hypoparathyroidism   For hypothyroidism she continues on Synthroid 200 mcg daily   For hypoparathyroidism she is maintained on calcitriol 1 mcg daily as well as calcium 600 mg twice a day and hydrochlorothiazide 25 mg daily  She presents today overall feeling okay    Did have pneumonia about a month ago, and is finally feeling better  She was put on prednisone, but did not make her feel well  She has been trying to increase physical activity, but has had a recent foot and ankle injury and is following up with podiatry  Continues to have concerns with her weight, but believes some of it is due to poor eating habits such as boredom eating  Denies any increasing fatigue, heat or cold intolerance, palpitations, abdominal pain, diarrhea, tremors, sweating, muscle aches or weakness  Review of Systems   Constitutional: Negative for activity change, appetite change, diaphoresis, fatigue and unexpected weight change  HENT: Negative for sore throat, trouble swallowing and voice change  Eyes: Negative for visual disturbance  Respiratory: Negative for chest tightness and shortness of breath  Cardiovascular: Negative for chest pain, palpitations and leg swelling  Gastrointestinal: Negative for abdominal pain, constipation and diarrhea  Endocrine: Negative for cold intolerance, heat intolerance, polydipsia, polyphagia and polyuria  Musculoskeletal: Positive for arthralgias  Skin: Negative for rash  Neurological: Negative for dizziness, tremors, light-headedness, numbness and headaches  Hematological: Negative for adenopathy  Psychiatric/Behavioral: Negative for dysphoric mood and sleep disturbance  The patient is not nervous/anxious  Historical Information   History reviewed  No pertinent past medical history  History reviewed  No pertinent surgical history    Social History   Social History     Substance and Sexual Activity   Alcohol Use None     Social History     Substance and Sexual Activity   Drug Use Not on file     Social History     Tobacco Use   Smoking Status Former   • Packs/day: 1 00   • Types: Cigarettes   • Quit date:    • Years since quittin 9   Smokeless Tobacco Never     Family History:   Family History   Problem Relation Age of Onset   • Hypertension Mother    • Obesity Mother    • Hyperlipidemia Mother    • Diabetes unspecified Father    • Arthritis Father    • Hypertension Father    • Cancer Maternal Grandmother    • Heart disease Paternal Grandmother    • Heart disease Paternal Grandfather    • Dementia Maternal Grandfather        Meds/Allergies   Current Outpatient Medications   Medication Sig Dispense Refill   • calcitriol (ROCALTROL) 0 5 MCG capsule take 2 capsules by mouth daily 60 capsule 11   • Calcium Carbonate (CVS CALCIUM-600 PO) Take 1,200 mg by mouth     • hydrochlorothiazide (HYDRODIURIL) 25 mg tablet take 1 tablet by mouth every morning 30 tablet 5   • Potassium Chloride ER 20 MEQ TBCR take 2 tablets by mouth twice a day 120 tablet 11   • Synthroid 200 MCG tablet take 1 tablet by mouth daily 30 tablet 5     No current facility-administered medications for this visit  Allergies   Allergen Reactions   • Amoxicillin-Pot Clavulanate Other (See Comments)       Objective   Vitals: Blood pressure 126/80, pulse 100, height 5' 7" (1 702 m), weight 112 kg (247 lb 9 6 oz)  Physical Exam  Vitals and nursing note reviewed  Constitutional:       General: She is not in acute distress  Appearance: Normal appearance  She is not diaphoretic  HENT:      Head: Normocephalic and atraumatic  Eyes:      General: No scleral icterus  Extraocular Movements: Extraocular movements intact  Conjunctiva/sclera: Conjunctivae normal       Pupils: Pupils are equal, round, and reactive to light  Neck:      Comments: Thyroid removed  Cardiovascular:      Rate and Rhythm: Normal rate and regular rhythm  Heart sounds: No murmur heard  Pulmonary:      Effort: Pulmonary effort is normal  No respiratory distress  Breath sounds: Normal breath sounds  No wheezing  Musculoskeletal:      Cervical back: Normal range of motion  Right lower leg: No edema  Left lower leg: No edema  Lymphadenopathy:      Cervical: No cervical adenopathy     Neurological: Mental Status: She is alert and oriented to person, place, and time  Mental status is at baseline  Sensory: No sensory deficit  Gait: Gait normal    Psychiatric:         Mood and Affect: Mood normal          Behavior: Behavior normal          Thought Content: Thought content normal          The history was obtained from the review of the chart, patient  Lab Results:   Lab Results   Component Value Date/Time    Free t4 1 61 12/20/2022 06:34 AM    Free t4 1 56 11/26/2022 07:46 AM    Free t4 1 52 08/26/2022 06:56 AM         Portions of the record may have been created with voice recognition software  Occasional wrong word or "sound a like" substitutions may have occurred due to the inherent limitations of voice recognition software  Read the chart carefully and recognize, using context, where substitutions have occurred

## 2023-01-26 DIAGNOSIS — E89.2 HYPOPARATHYROIDISM AFTER PROCEDURE (HCC): ICD-10-CM

## 2023-01-26 NOTE — TELEPHONE ENCOUNTER
Requested medication(s) are due for refill today: yes  Patient has already received a courtesy refill: no  Other reason request has been forwarded to provider: protocol failed

## 2023-01-27 RX ORDER — CALCITRIOL 0.5 UG/1
CAPSULE, LIQUID FILLED ORAL
Qty: 60 CAPSULE | Refills: 11 | Status: SHIPPED | OUTPATIENT
Start: 2023-01-27

## 2023-02-09 DIAGNOSIS — I10 HYPERTENSION, UNSPECIFIED TYPE: ICD-10-CM

## 2023-02-09 RX ORDER — POTASSIUM CHLORIDE 1500 MG/1
2 TABLET, FILM COATED, EXTENDED RELEASE ORAL 2 TIMES DAILY
Qty: 120 TABLET | Refills: 11 | Status: SHIPPED | OUTPATIENT
Start: 2023-02-09

## 2023-05-26 DIAGNOSIS — E89.0 POSTOPERATIVE HYPOTHYROIDISM: ICD-10-CM

## 2023-05-26 DIAGNOSIS — I10 HYPERTENSION, UNSPECIFIED TYPE: ICD-10-CM

## 2023-05-30 RX ORDER — LEVOTHYROXINE SODIUM 200 MCG
TABLET ORAL
Qty: 30 TABLET | Refills: 5 | Status: SHIPPED | OUTPATIENT
Start: 2023-05-30

## 2023-05-30 RX ORDER — HYDROCHLOROTHIAZIDE 25 MG/1
TABLET ORAL
Qty: 30 TABLET | Refills: 5 | Status: SHIPPED | OUTPATIENT
Start: 2023-05-30

## 2023-06-16 LAB
25(OH)D3+25(OH)D2 SERPL-MCNC: 33.5 NG/ML (ref 30–100)
ALBUMIN SERPL-MCNC: 4.3 G/DL (ref 3.8–4.8)
ALBUMIN/GLOB SERPL: 1.6 {RATIO} (ref 1.2–2.2)
ALP SERPL-CCNC: 73 IU/L (ref 44–121)
ALT SERPL-CCNC: 31 IU/L (ref 0–32)
AST SERPL-CCNC: 34 IU/L (ref 0–40)
BILIRUB SERPL-MCNC: 0.2 MG/DL (ref 0–1.2)
BUN SERPL-MCNC: 10 MG/DL (ref 6–24)
BUN/CREAT SERPL: 11 (ref 9–23)
CALCIUM SERPL-MCNC: 8.9 MG/DL (ref 8.7–10.2)
CHLORIDE SERPL-SCNC: 102 MMOL/L (ref 96–106)
CO2 SERPL-SCNC: 27 MMOL/L (ref 20–29)
CREAT SERPL-MCNC: 0.89 MG/DL (ref 0.57–1)
EGFR: 83 ML/MIN/1.73
EST. AVERAGE GLUCOSE BLD GHB EST-MCNC: 128 MG/DL
GLOBULIN SER-MCNC: 2.7 G/DL (ref 1.5–4.5)
GLUCOSE SERPL-MCNC: 89 MG/DL (ref 70–99)
HBA1C MFR BLD: 6.1 % (ref 4.8–5.6)
POTASSIUM SERPL-SCNC: 4.1 MMOL/L (ref 3.5–5.2)
PROT SERPL-MCNC: 7 G/DL (ref 6–8.5)
SODIUM SERPL-SCNC: 144 MMOL/L (ref 134–144)
T4 FREE SERPL-MCNC: 1.94 NG/DL (ref 0.82–1.77)
TSH SERPL DL<=0.005 MIU/L-ACNC: 0.11 UIU/ML (ref 0.45–4.5)

## 2023-06-21 ENCOUNTER — OFFICE VISIT (OUTPATIENT)
Dept: ENDOCRINOLOGY | Facility: HOSPITAL | Age: 43
End: 2023-06-21
Payer: COMMERCIAL

## 2023-06-21 VITALS
HEART RATE: 87 BPM | DIASTOLIC BLOOD PRESSURE: 102 MMHG | HEIGHT: 67 IN | WEIGHT: 236 LBS | BODY MASS INDEX: 37.04 KG/M2 | OXYGEN SATURATION: 98 % | SYSTOLIC BLOOD PRESSURE: 142 MMHG

## 2023-06-21 DIAGNOSIS — E89.2 HYPOPARATHYROIDISM AFTER PROCEDURE (HCC): ICD-10-CM

## 2023-06-21 DIAGNOSIS — R73.03 PREDIABETES: ICD-10-CM

## 2023-06-21 DIAGNOSIS — E89.0 POSTOPERATIVE HYPOTHYROIDISM: Primary | ICD-10-CM

## 2023-06-21 PROCEDURE — 99214 OFFICE O/P EST MOD 30 MIN: CPT | Performed by: PHYSICIAN ASSISTANT

## 2023-06-21 RX ORDER — IBUPROFEN 600 MG/1
TABLET ORAL
COMMUNITY
Start: 2023-06-20

## 2023-06-21 NOTE — PATIENT INSTRUCTIONS
Decrease Synthroid 175 mcg daily  Continue calcitriol 1 mcg daily and calcium 600 mg twice a day  Get lab work in about 6 weeks  Consider following up with diabetes education  Contact the office with any concerns or questions, or any change in symptoms  Follow-up in 6 months with lab work completed prior to visit
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
## 2023-11-22 DIAGNOSIS — I10 HYPERTENSION, UNSPECIFIED TYPE: ICD-10-CM

## 2023-11-22 DIAGNOSIS — E89.0 POSTOPERATIVE HYPOTHYROIDISM: ICD-10-CM

## 2023-11-22 RX ORDER — HYDROCHLOROTHIAZIDE 25 MG/1
TABLET ORAL
Qty: 30 TABLET | Refills: 0 | Status: SHIPPED | OUTPATIENT
Start: 2023-11-22

## 2023-11-22 RX ORDER — LEVOTHYROXINE SODIUM 200 MCG
TABLET ORAL
Qty: 30 TABLET | Refills: 0 | Status: SHIPPED | OUTPATIENT
Start: 2023-11-22

## 2024-01-02 ENCOUNTER — OFFICE VISIT (OUTPATIENT)
Dept: ENDOCRINOLOGY | Facility: HOSPITAL | Age: 44
End: 2024-01-02
Payer: COMMERCIAL

## 2024-01-02 VITALS
WEIGHT: 238 LBS | BODY MASS INDEX: 37.35 KG/M2 | OXYGEN SATURATION: 99 % | HEART RATE: 111 BPM | HEIGHT: 67 IN | SYSTOLIC BLOOD PRESSURE: 130 MMHG | DIASTOLIC BLOOD PRESSURE: 88 MMHG

## 2024-01-02 DIAGNOSIS — I10 HYPERTENSION, UNSPECIFIED TYPE: ICD-10-CM

## 2024-01-02 DIAGNOSIS — E89.0 POSTOPERATIVE HYPOTHYROIDISM: Primary | ICD-10-CM

## 2024-01-02 DIAGNOSIS — R73.03 PREDIABETES: ICD-10-CM

## 2024-01-02 DIAGNOSIS — E89.2 HYPOPARATHYROIDISM AFTER PROCEDURE (HCC): ICD-10-CM

## 2024-01-02 PROCEDURE — 99214 OFFICE O/P EST MOD 30 MIN: CPT | Performed by: PHYSICIAN ASSISTANT

## 2024-01-02 RX ORDER — LEVOTHYROXINE SODIUM 200 MCG
TABLET ORAL
Qty: 90 TABLET | Refills: 3 | Status: SHIPPED | OUTPATIENT
Start: 2024-01-02

## 2024-01-02 RX ORDER — CALCITRIOL 0.5 UG/1
1 CAPSULE, LIQUID FILLED ORAL DAILY
Qty: 60 CAPSULE | Refills: 11 | Status: SHIPPED | OUTPATIENT
Start: 2024-01-02

## 2024-01-02 RX ORDER — POTASSIUM CHLORIDE 1500 MG/1
2 TABLET, EXTENDED RELEASE ORAL 2 TIMES DAILY
Qty: 120 TABLET | Refills: 11 | Status: SHIPPED | OUTPATIENT
Start: 2024-01-02

## 2024-01-02 RX ORDER — HYDROCHLOROTHIAZIDE 25 MG/1
25 TABLET ORAL EVERY MORNING
Qty: 90 TABLET | Refills: 3 | Status: SHIPPED | OUTPATIENT
Start: 2024-01-02

## 2024-01-02 NOTE — PATIENT INSTRUCTIONS
Continue Synthroid 175 mcg daily.    Continue calcitriol 1 mcg daily and calcium 600 mg twice a day.     Get lab work at this time.     Consider following up with diabetes education.    Contact the office with any concerns or questions, or any change in symptoms.    Follow-up in 6 months with lab work completed prior to visit.

## 2024-01-02 NOTE — PROGRESS NOTES
Chelsea Mohr 43 y.o. female MRN: [id number]    Encounter: [id number]      Assessment/Plan     Assessment:  This is a 43 y.o.-year-old female with hypothyroidism, hypoparathyroidism, prediabetes.    Plan:  1. Hypothyroidism: She had lab work done recently, but TSH and free T4 were not drawn.  Clinically euthyroid at this time so she will continue Synthroid 175 mcg.  I asked her to get lab work completed at this time to see if we do need to make adjustments as her dose was decreased at last office visit.  Contact the office if there is any change in symptoms.  Follow-up in 6 months with lab work completed prior to visit.     2. Hypoparathyroidism: Calcium levels are in normal range.  Continue with calcitriol 1 mcg daily and calcium 600 mg twice a day. Contact the office if there is any change in symptoms.     3. Pre diabetes: No recent A1c was obtained.  She does admit to some poor dietary habits over the holiday season and knows she needs to make adjustments which would be my recommendation at this time.  I am fine with holding off on repeating an A1c prior to her next office visit.  Would encourage following up with a dietitian in general to help with her overall health.  Repeat hemoglobin A1c prior to next office visit.    CC: Hypothyroidism and hypoparathyroidism follow-up    History of Present Illness     HPI:  Chelsea Mohr is a 43 year old female with hypothyroidism after thyroid surgery for treatment of Graves disease who presents for a follow-up appointment.  She also has postprocedural hypoparathyroidism.  For hypothyroidism she continues on Synthroid 175 mcg daily.  For hypoparathyroidism she is maintained on calcitriol 1 mcg daily as well as calcium 600 mg twice a day and hydrochlorothiazide 25 mg daily.  She presents today overall feeling okay.  Has been diagnosed with prediabetes.  Does have a family history of diabetes.  Denies any significant lifestyle modification since last office visit.  Does admit to  poor dietary habits as the last couple months have been the holiday season.  Has realized that she does need to work out to overall improve her health.  It is 2 pounds more since last office visit, but does believe she has gained about 7 pounds over the last 1 to 2 months.  Denies any increasing fatigue, heat or cold intolerance, palpitations, abdominal pain, diarrhea, tremors, sweating, muscle aches or weakness.  Denies any headaches, vision changes, chest pain.  Overall doing well today without any questions.    Review of Systems   Constitutional:  Negative for activity change, appetite change, diaphoresis, fatigue and unexpected weight change.   HENT:  Negative for sore throat, trouble swallowing and voice change.    Eyes:  Negative for visual disturbance.   Respiratory:  Negative for chest tightness and shortness of breath.    Cardiovascular:  Negative for chest pain, palpitations and leg swelling.   Gastrointestinal:  Negative for abdominal pain, constipation and diarrhea.   Endocrine: Negative for cold intolerance, heat intolerance, polydipsia, polyphagia and polyuria.   Skin:  Negative for rash.   Neurological:  Negative for dizziness, tremors, light-headedness, numbness and headaches.   Hematological:  Negative for adenopathy.   Psychiatric/Behavioral:  Negative for dysphoric mood and sleep disturbance. The patient is not nervous/anxious.        Historical Information   History reviewed. No pertinent past medical history.  History reviewed. No pertinent surgical history.  Social History   Social History     Substance and Sexual Activity   Alcohol Use None     Social History     Substance and Sexual Activity   Drug Use Not on file     Social History     Tobacco Use   Smoking Status Former   • Current packs/day: 0.00   • Types: Cigarettes   • Quit date: 2009   • Years since quitting: 15.0   Smokeless Tobacco Never     Family History:   Family History   Problem Relation Age of Onset   • Hypertension Mother    •  "Obesity Mother    • Hyperlipidemia Mother    • Diabetes unspecified Father    • Arthritis Father    • Hypertension Father    • Cancer Maternal Grandmother    • Heart disease Paternal Grandmother    • Heart disease Paternal Grandfather    • Dementia Maternal Grandfather        Meds/Allergies   Current Outpatient Medications   Medication Sig Dispense Refill   • calcitriol (ROCALTROL) 0.5 MCG capsule Take 2 capsules (1 mcg total) by mouth daily 60 capsule 11   • Calcium Carbonate ([employer]S CALCIUM-600 PO) Take 1,200 mg by mouth     • hydrochlorothiazide (HYDRODIURIL) 25 mg tablet Take 1 tablet (25 mg total) by mouth every morning 90 tablet 3   • ibuprofen (MOTRIN) 600 mg tablet TAKE 1 TABLET ORALLY THREE TIMES A DAY AS NEEDED FOR PAIN     • Potassium Chloride ER 20 MEQ TBCR Take 2 tablets (40 mEq total) by mouth 2 (two) times a day 120 tablet 11   • Synthroid 200 MCG tablet take 1 tablet by mouth daily 90 tablet 3     No current facility-administered medications for this visit.     Allergies   Allergen Reactions   • Amoxicillin-Pot Clavulanate GI Intolerance and Vomiting     Augmentin       Objective   Vitals: Blood pressure 130/88, pulse (!) 111, height 5' 7\" (1.702 m), weight 108 kg (238 lb), SpO2 99%.    Physical Exam  Vitals and nursing note reviewed.   Constitutional:       General: She is not in acute distress.     Appearance: Normal appearance. She is not diaphoretic.   HENT:      Head: Normocephalic and atraumatic.   Eyes:      General: No scleral icterus.     Extraocular Movements: Extraocular movements intact.      Conjunctiva/sclera: Conjunctivae normal.      Pupils: Pupils are equal, round, and reactive to light.   Neck:      Comments: Thyroid resected  Cardiovascular:      Rate and Rhythm: Normal rate and regular rhythm.      Heart sounds: No murmur heard.  Pulmonary:      Effort: Pulmonary effort is normal. No respiratory distress.      Breath sounds: Normal breath sounds. No wheezing.   Musculoskeletal:      " "Cervical back: Normal range of motion.      Right lower leg: No edema.      Left lower leg: No edema.   Lymphadenopathy:      Cervical: No cervical adenopathy.   Neurological:      Mental Status: She is alert and oriented to person, place, and time. Mental status is at baseline.      Sensory: No sensory deficit.      Gait: Gait normal.   Psychiatric:         Mood and Affect: Mood normal.         Behavior: Behavior normal.         Thought Content: Thought content normal.         The history was obtained from the review of the chart, patient.    Lab Results:   Lab Results   Component Value Date/Time    Free t4 1.94 (H) 06/15/2023 07:16 AM         Portions of the record may have been created with voice recognition software. Occasional wrong word or \"sound a like\" substitutions may have occurred due to the inherent limitations of voice recognition software. Read the chart carefully and recognize, using context, where substitutions have occurred.    "

## 2024-01-16 NOTE — ADDENDUM NOTE
Addended by: Bruce Eaton on: 12/3/2018 04:13 PM     Modules accepted: Orders [Dear  ___] : Dear  [unfilled], [Courtesy Letter:] : I had the pleasure of seeing your patient, [unfilled], in my office today. [Please see my note below.] : Please see my note below. [Sincerely,] : Sincerely, [FreeTextEntry3] : Ed  Rodney Liz MD [facility]  [facility] [place] [facility] [employer] at [facility]

## 2024-03-19 LAB
T4 FREE SERPL-MCNC: 1.63 NG/DL (ref 0.82–1.77)
TSH SERPL DL<=0.005 MIU/L-ACNC: 2.42 UIU/ML (ref 0.45–4.5)

## 2024-07-09 LAB — HBA1C MFR BLD HPLC: 6.3 %

## 2024-07-10 ENCOUNTER — OFFICE VISIT (OUTPATIENT)
Dept: ENDOCRINOLOGY | Facility: HOSPITAL | Age: 44
End: 2024-07-10
Payer: COMMERCIAL

## 2024-07-10 VITALS
OXYGEN SATURATION: 97 % | SYSTOLIC BLOOD PRESSURE: 128 MMHG | HEIGHT: 67 IN | WEIGHT: 243 LBS | BODY MASS INDEX: 38.14 KG/M2 | DIASTOLIC BLOOD PRESSURE: 98 MMHG | HEART RATE: 107 BPM

## 2024-07-10 DIAGNOSIS — E89.2 HYPOPARATHYROIDISM AFTER PROCEDURE (HCC): ICD-10-CM

## 2024-07-10 DIAGNOSIS — R73.03 PREDIABETES: ICD-10-CM

## 2024-07-10 DIAGNOSIS — E89.0 POSTOPERATIVE HYPOTHYROIDISM: Primary | ICD-10-CM

## 2024-07-10 PROCEDURE — 99214 OFFICE O/P EST MOD 30 MIN: CPT | Performed by: PHYSICIAN ASSISTANT

## 2024-07-10 NOTE — PROGRESS NOTES
Chelsea Mohr 43 y.o. female MRN: [id number]    Encounter: [id number]      Assessment & Plan     Assessment:  This is a 43 y.o.-year-old female with hypothyroidism, hypoparathyroidism, prediabetes.    Plan:  1. Hypothyroidism: Last set of thyroid lab work completed March 2024 showed normal thyroid levels.  She did have lab work completed recently, but has yet to be resulted at [facility].  Once results are in we will call to see if there is any adjustments that need to be made.  At this time she will continue with levothyroxine 200 mcg daily.  She will contact the office in the meantime with any concerns or questions.     2. Hypoparathyroidism: Lab work was completed yesterday, but we have yet to receive the results..  Continue with calcitriol 1 mcg daily and calcium 600 mg twice a day. Contact the office if there is any change in symptoms.  This results are in, we will give further recommendations.     3. Pre diabetes: Hemoglobin A1c from June 2023 was 6.1.  Did have an A1c completed with recent set of lab work, but we have yet to receive the results.  At this time encourage lifestyle modifications to help improve glucose levels.  Further recommendations will be made once results are in to see if medications are needed to help improve glucose levels.    4.  Weight gain: Weight has been slowly increasing over the last 3 years.  At this time there is no concerns of thyroid issues causing the weight gain.  Did have an in-depth workup when initially seen in the office back in 2018.  Unfortunately no lab work results are in yet, and would like to see lab results before making any further recommendations.  This lab results are in, will likely order additional lab work such as IGF-1, cortisol, ACTH, testosterone, DHEA-S, insulin levels to see if there are any contributing factors in her weight.    CC: Hypothyroidism and hypoparathyroidism follow-up    History of Present Illness     HPI:  Chelsea Mohr is a 43 year old female  with hypothyroidism after thyroid surgery for treatment of Graves disease who presents for a follow-up appointment.  She also has postprocedural hypoparathyroidism.  For hypothyroidism she continues on Synthroid 200 mcg daily.  For hypoparathyroidism she is maintained on calcitriol 1 mcg daily as well as calcium 600 mg twice a day and hydrochlorothiazide 25 mg daily.  She presents today overall feeling okay.  Has been diagnosed with prediabetes.  Does have a family history of diabetes.  Recently got back from vacation, was hoping that she would have had some weight loss.  Was increasing physical activity through walking and hiking.  Has been trying to decrease calorie intake and carbs in diet.  Has realized that she does need to work out to overall improve her health.  She is 5 pounds more since last office visit.  Denies any increasing fatigue, heat or cold intolerance, palpitations, abdominal pain, diarrhea, tremors, sweating, muscle aches or weakness.  Denies any headaches, vision changes, chest pain.  Overall doing well today without any questions.     Review of Systems   Constitutional:  Negative for activity change, appetite change, diaphoresis, fatigue and unexpected weight change.   HENT:  Negative for sore throat, trouble swallowing and voice change.    Eyes:  Negative for visual disturbance.   Respiratory:  Negative for chest tightness and shortness of breath.    Cardiovascular:  Negative for chest pain, palpitations and leg swelling.   Gastrointestinal:  Negative for abdominal pain, constipation and diarrhea.   Endocrine: Negative for cold intolerance, heat intolerance, polydipsia, polyphagia and polyuria.   Skin:  Negative for rash.   Neurological:  Negative for dizziness, tremors, light-headedness, numbness and headaches.   Hematological:  Negative for adenopathy.   Psychiatric/Behavioral:  Negative for dysphoric mood and sleep disturbance. The patient is not nervous/anxious.        Historical  "Information   History reviewed. No pertinent past medical history.  History reviewed. No pertinent surgical history.  Social History   Social History     Substance and Sexual Activity   Alcohol Use None     Social History     Substance and Sexual Activity   Drug Use Not on file     Social History     Tobacco Use   Smoking Status Former   • Current packs/day: 0.00   • Types: Cigarettes   • Quit date: 2009   • Years since quitting: 15.5   Smokeless Tobacco Never     Family History:   Family History   Problem Relation Age of Onset   • Hypertension Mother    • Obesity Mother    • Hyperlipidemia Mother    • Diabetes unspecified Father    • Arthritis Father    • Hypertension Father    • Cancer Maternal Grandmother    • Heart disease Paternal Grandmother    • Heart disease Paternal Grandfather    • Dementia Maternal Grandfather        Meds/Allergies   Current Outpatient Medications   Medication Sig Dispense Refill   • calcitriol (ROCALTROL) 0.5 MCG capsule Take 2 capsules (1 mcg total) by mouth daily 60 capsule 11   • Calcium Carbonate ([employer]S CALCIUM-600 PO) Take 1,200 mg by mouth     • hydrochlorothiazide (HYDRODIURIL) 25 mg tablet Take 1 tablet (25 mg total) by mouth every morning 90 tablet 3   • Potassium Chloride ER 20 MEQ TBCR Take 2 tablets (40 mEq total) by mouth 2 (two) times a day 120 tablet 11   • Synthroid 200 MCG tablet take 1 tablet by mouth daily 90 tablet 3   • ibuprofen (MOTRIN) 600 mg tablet TAKE 1 TABLET ORALLY THREE TIMES A DAY AS NEEDED FOR PAIN (Patient not taking: Reported on 7/10/2024)       No current facility-administered medications for this visit.     Allergies   Allergen Reactions   • Amoxicillin-Pot Clavulanate GI Intolerance and Vomiting     Augmentin       Objective   Vitals: Blood pressure 128/98, pulse (!) 107, height 5' 7\" (1.702 m), weight 110 kg (243 lb), SpO2 97%.    Physical Exam  Vitals and nursing note reviewed.   Constitutional:       General: She is not in acute distress.     " "Appearance: Normal appearance. She is not diaphoretic.   HENT:      Head: Normocephalic and atraumatic.   Eyes:      General: No scleral icterus.     Extraocular Movements: Extraocular movements intact.      Conjunctiva/sclera: Conjunctivae normal.      Pupils: Pupils are equal, round, and reactive to light.   Cardiovascular:      Rate and Rhythm: Normal rate and regular rhythm.      Heart sounds: No murmur heard.  Pulmonary:      Effort: Pulmonary effort is normal. No respiratory distress.      Breath sounds: Normal breath sounds. No wheezing.   Musculoskeletal:      Cervical back: Normal range of motion.      Right lower leg: No edema.      Left lower leg: No edema.   Lymphadenopathy:      Cervical: No cervical adenopathy.   Neurological:      Mental Status: She is alert and oriented to person, place, and time. Mental status is at baseline.      Sensory: No sensory deficit.      Gait: Gait normal.   Psychiatric:         Mood and Affect: Mood normal.         Behavior: Behavior normal.         Thought Content: Thought content normal.         The history was obtained from the review of the chart, patient.    Lab Results:   Lab Results   Component Value Date/Time    Free t4 1.63 03/18/2024 10:24 AM       Portions of the record may have been created with voice recognition software. Occasional wrong word or \"sound a like\" substitutions may have occurred due to the inherent limitations of voice recognition software. Read the chart carefully and recognize, using context, where substitutions have occurred.    "

## 2024-07-10 NOTE — PATIENT INSTRUCTIONS
Continue Synthroid 200 mcg daily.    Continue calcitriol 1 mcg daily and calcium 600 mg twice a day.     Get lab work at this time.     Consider following up with diabetes education.    Contact the office with any concerns or questions, or any change in symptoms.    Follow-up in 6 months with lab work completed prior to visit.

## 2024-12-14 DIAGNOSIS — I10 HYPERTENSION, UNSPECIFIED TYPE: ICD-10-CM

## 2024-12-16 RX ORDER — HYDROCHLOROTHIAZIDE 25 MG/1
25 TABLET ORAL EVERY MORNING
Qty: 30 TABLET | Refills: 0 | Status: SHIPPED | OUTPATIENT
Start: 2024-12-16

## 2024-12-19 DIAGNOSIS — E89.0 POSTOPERATIVE HYPOTHYROIDISM: ICD-10-CM

## 2024-12-19 NOTE — TELEPHONE ENCOUNTER
Patient prefers 30-day supply with refills    Reason for call:   [x] Refill   [] Prior Auth  [] Other:     Office:   [x] Specialty/Provider - forest / [facility]ickie    Medication: brand Synthroid    Dose/Frequency: 200 mcg qd    Quantity: 30    Pharmacy: [facility].     Does the patient have enough for 3 days?   [x] Yes   [] No - Send as HP to POD

## 2024-12-20 RX ORDER — LEVOTHYROXINE SODIUM 200 MCG
TABLET ORAL
Qty: 30 TABLET | Refills: 5 | Status: SHIPPED | OUTPATIENT
Start: 2024-12-20

## 2024-12-26 NOTE — TELEPHONE ENCOUNTER
Patient called requesting refill for Synthroid 200mcg. Patient made aware medication was refilled on 12/20/24 for 30 with 5 refills to [facility] pharmacy. Patient instructed to contact the pharmacy to obtain refills of medication. Patient verbalized understanding.

## 2025-01-10 ENCOUNTER — RESULTS FOLLOW-UP (OUTPATIENT)
Dept: ENDOCRINOLOGY | Facility: HOSPITAL | Age: 45
End: 2025-01-10

## 2025-01-10 LAB
25(OH)D3+25(OH)D2 SERPL-MCNC: 37.7 NG/ML (ref 30–100)
ALBUMIN SERPL-MCNC: 4.2 G/DL (ref 3.9–4.9)
ALP SERPL-CCNC: 83 IU/L (ref 44–121)
ALT SERPL-CCNC: 35 IU/L (ref 0–32)
AST SERPL-CCNC: 35 IU/L (ref 0–40)
BILIRUB SERPL-MCNC: 0.3 MG/DL (ref 0–1.2)
BUN SERPL-MCNC: 13 MG/DL (ref 6–24)
BUN/CREAT SERPL: 14 (ref 9–23)
CALCIUM SERPL-MCNC: 8.7 MG/DL (ref 8.7–10.2)
CHLORIDE SERPL-SCNC: 100 MMOL/L (ref 96–106)
CO2 SERPL-SCNC: 29 MMOL/L (ref 20–29)
CREAT SERPL-MCNC: 0.96 MG/DL (ref 0.57–1)
EGFR: 75 ML/MIN/1.73
GLOBULIN SER-MCNC: 2.7 G/DL (ref 1.5–4.5)
GLUCOSE SERPL-MCNC: 103 MG/DL (ref 70–99)
HBA1C MFR BLD: 6.4 % (ref 4.8–5.6)
POTASSIUM SERPL-SCNC: 4 MMOL/L (ref 3.5–5.2)
PROT SERPL-MCNC: 6.9 G/DL (ref 6–8.5)
PTH-INTACT SERPL-MCNC: 5 PG/ML (ref 15–65)
SODIUM SERPL-SCNC: 144 MMOL/L (ref 134–144)
T4 FREE SERPL-MCNC: 1.6 NG/DL (ref 0.82–1.77)
TSH SERPL DL<=0.005 MIU/L-ACNC: 1 UIU/ML (ref 0.45–4.5)

## 2025-01-13 DIAGNOSIS — E89.2 HYPOPARATHYROIDISM AFTER PROCEDURE (HCC): ICD-10-CM

## 2025-01-13 DIAGNOSIS — I10 HYPERTENSION, UNSPECIFIED TYPE: ICD-10-CM

## 2025-01-14 ENCOUNTER — OFFICE VISIT (OUTPATIENT)
Dept: ENDOCRINOLOGY | Facility: HOSPITAL | Age: 45
End: 2025-01-14
Payer: COMMERCIAL

## 2025-01-14 VITALS
BODY MASS INDEX: 39.65 KG/M2 | HEART RATE: 88 BPM | WEIGHT: 252.6 LBS | DIASTOLIC BLOOD PRESSURE: 90 MMHG | SYSTOLIC BLOOD PRESSURE: 144 MMHG | HEIGHT: 67 IN

## 2025-01-14 DIAGNOSIS — I10 HYPERTENSION, UNSPECIFIED TYPE: ICD-10-CM

## 2025-01-14 DIAGNOSIS — R73.03 PREDIABETES: ICD-10-CM

## 2025-01-14 DIAGNOSIS — R63.5 WEIGHT GAIN: ICD-10-CM

## 2025-01-14 DIAGNOSIS — E89.0 POSTOPERATIVE HYPOTHYROIDISM: Primary | ICD-10-CM

## 2025-01-14 DIAGNOSIS — E89.2 HYPOPARATHYROIDISM AFTER PROCEDURE (HCC): ICD-10-CM

## 2025-01-14 PROCEDURE — 99214 OFFICE O/P EST MOD 30 MIN: CPT | Performed by: PHYSICIAN ASSISTANT

## 2025-01-14 RX ORDER — CALCITRIOL 0.5 UG/1
1 CAPSULE, LIQUID FILLED ORAL DAILY
Qty: 60 CAPSULE | Refills: 11 | Status: SHIPPED | OUTPATIENT
Start: 2025-01-14

## 2025-01-14 RX ORDER — HYDROCHLOROTHIAZIDE 25 MG/1
25 TABLET ORAL EVERY MORNING
Qty: 30 TABLET | Refills: 0 | OUTPATIENT
Start: 2025-01-14

## 2025-01-14 RX ORDER — HYDROCHLOROTHIAZIDE 25 MG/1
25 TABLET ORAL EVERY MORNING
Qty: 90 TABLET | Refills: 3 | Status: SHIPPED | OUTPATIENT
Start: 2025-01-14

## 2025-01-14 RX ORDER — POTASSIUM CHLORIDE 1500 MG/1
2 TABLET, EXTENDED RELEASE ORAL 2 TIMES DAILY
Qty: 120 TABLET | Refills: 0 | OUTPATIENT
Start: 2025-01-14

## 2025-01-14 RX ORDER — POTASSIUM CHLORIDE 1500 MG/1
2 TABLET, EXTENDED RELEASE ORAL 2 TIMES DAILY
Qty: 120 TABLET | Refills: 11 | Status: SHIPPED | OUTPATIENT
Start: 2025-01-14

## 2025-01-14 RX ORDER — CALCITRIOL 0.5 UG/1
1 CAPSULE, LIQUID FILLED ORAL DAILY
Qty: 60 CAPSULE | Refills: 0 | OUTPATIENT
Start: 2025-01-14

## 2025-01-14 NOTE — PROGRESS NOTES
Chelsea Mohr 44 y.o. female MRN: [id number]    Encounter: [id number]      Assessment & Plan     Assessment:  This is a 44 y.o.-year-old female with hypothyroidism, hypoparathyroidism, prediabetes.    Plan:  1. Hypothyroidism: Most recent thyroid lab work came back normal.  Clinically and biochemically euthyroid.  At this time she will continue with Synthroid 200 mcg daily.  If there is any change in symptoms she will contact the office.  She will follow-up in 3 months with labwork completed prior to visit.     2. Hypoparathyroidism: PTH levels remain low, but calcium and vitamin D were in normal range.  Continue with calcitriol 1 mcg daily and calcium 600 mg twice a day. Contact the office if there is any change in symptoms.  This results are in, we will give further recommendations.     3. Pre diabetes: Most recent hemoglobin A1c completed January 8, 2025 was 6.4.  This is the highest her A1c has been and still puts her in the prediabetes range.  We did have a discussion about making lifestyle changes to help improve glucose levels.  She normally follows up every 6 months, but I would like her to follow-up in 3 months to see if we can have her improve her A1c within this timeframe.  Need for medication at this time.     4.  Weight gain: Weight has been slowly increasing over the last 3 years.  At this time there is no concerns of thyroid issues causing the weight gain.  Did have an in-depth workup when initially seen in the office back in 2018.  At this time ordered IGF-1, cortisol, ACTH, testosterone, DHEA-S, insulin levels to see if there are any contributing factors in her weight.  Did discuss that there might be some concern with PCOS contributing to the symptoms.  Otherwise not sure if there might be any other endocrine disorder at this time.    CC: Hypothyroidism and hypoparathyroidism follow-up    History of Present Illness     HPI:  Chelsea Mohr is a 43 year old female with hypothyroidism after thyroid  surgery for treatment of Graves disease who presents for a follow-up appointment.  She also has postprocedural hypoparathyroidism.  For hypothyroidism she continues on Synthroid 200 mcg daily.  For hypoparathyroidism she is maintained on calcitriol 1 mcg daily as well as calcium 600 mg twice a day and hydrochlorothiazide 25 mg daily.  She presents today overall feeling okay.  Has been diagnosed with prediabetes.  Does have a family history of diabetes.  Was increasing physical activity through walking and hiking.  Has been trying to decrease calorie intake and carbs in diet.  Has realized that she does need to work out to overall improve her health.  She is 9 pounds more since last office visit.  Denies any increasing fatigue, heat or cold intolerance, palpitations, abdominal pain, diarrhea, tremors, sweating, muscle aches or weakness.  Denies any headaches, vision changes, chest pain.  Decently periods have been further apart.  In general states that her cycles have not been regular.  General is just worried about her weight today is everything she tries did not seem to help improve her overall weight.     Review of Systems   Constitutional:  Positive for unexpected weight change. Negative for activity change, appetite change, diaphoresis and fatigue.   HENT:  Negative for sore throat, trouble swallowing and voice change.    Eyes:  Negative for visual disturbance.   Respiratory:  Negative for chest tightness and shortness of breath.    Cardiovascular:  Negative for chest pain, palpitations and leg swelling.   Gastrointestinal:  Negative for abdominal pain, constipation and diarrhea.   Endocrine: Negative for cold intolerance, heat intolerance, polydipsia, polyphagia and polyuria.   Genitourinary:  Positive for menstrual problem.   Skin:  Negative for rash.   Neurological:  Negative for dizziness, tremors, light-headedness, numbness and headaches.   Hematological:  Negative for adenopathy.   Psychiatric/Behavioral:   "Negative for dysphoric mood and sleep disturbance. The patient is not nervous/anxious.        Historical Information   History reviewed. No pertinent past medical history.  History reviewed. No pertinent surgical history.  Social History   Social History     Substance and Sexual Activity   Alcohol Use None     Social History     Substance and Sexual Activity   Drug Use Not on file     Social History     Tobacco Use   Smoking Status Former   • Current packs/day: 0.00   • Types: Cigarettes   • Quit date:    • Years since quittin.0   Smokeless Tobacco Never     Family History:   Family History   Problem Relation Age of Onset   • Hypertension Mother    • Obesity Mother    • Hyperlipidemia Mother    • Diabetes unspecified Father    • Arthritis Father    • Hypertension Father    • Cancer Maternal Grandmother    • Heart disease Paternal Grandmother    • Heart disease Paternal Grandfather    • Dementia Maternal Grandfather        Meds/Allergies   Current Outpatient Medications   Medication Sig Dispense Refill   • calcitriol (ROCALTROL) 0.5 MCG capsule Take 2 capsules (1 mcg total) by mouth daily 60 capsule 11   • Calcium Carbonate ([facility] CALCIUM-600 PO) Take 1,200 mg by mouth     • hydroCHLOROthiazide 25 mg tablet Take 1 tablet (25 mg total) by mouth every morning 90 tablet 3   • Potassium Chloride ER 20 MEQ TBCR Take 2 tablets (40 mEq total) by mouth 2 (two) times a day 120 tablet 11   • Synthroid 200 MCG tablet take 1 tablet by mouth daily 30 tablet 5   • ibuprofen (MOTRIN) 600 mg tablet TAKE 1 TABLET ORALLY THREE TIMES A DAY AS NEEDED FOR PAIN (Patient not taking: Reported on 7/10/2024)       No current facility-administered medications for this visit.     Allergies   Allergen Reactions   • Amoxicillin-Pot Clavulanate GI Intolerance and Vomiting     Augmentin       Objective   Vitals: Blood pressure 144/90, pulse 88, height 5' 7\" (1.702 m), weight 115 kg (252 lb 9.6 oz).    Physical Exam  Vitals and nursing note " "reviewed.   Constitutional:       General: She is not in acute distress.     Appearance: Normal appearance. She is obese. She is not diaphoretic.   HENT:      Head: Normocephalic and atraumatic.   Eyes:      General: No scleral icterus.     Extraocular Movements: Extraocular movements intact.      Conjunctiva/sclera: Conjunctivae normal.      Pupils: Pupils are equal, round, and reactive to light.   Cardiovascular:      Rate and Rhythm: Normal rate and regular rhythm.      Heart sounds: No murmur heard.  Pulmonary:      Effort: Pulmonary effort is normal. No respiratory distress.      Breath sounds: Normal breath sounds. No wheezing.   Musculoskeletal:      Cervical back: Normal range of motion.      Right lower leg: No edema.      Left lower leg: No edema.   Lymphadenopathy:      Cervical: No cervical adenopathy.   Neurological:      Mental Status: She is alert and oriented to person, place, and time. Mental status is at baseline.      Sensory: No sensory deficit.      Gait: Gait normal.   Psychiatric:         Mood and Affect: Mood normal.         Behavior: Behavior normal.         Thought Content: Thought content normal.         The history was obtained from the review of the chart, patient.    Lab Results:   Lab Results   Component Value Date/Time    Hemoglobin A1C 6.4 (H) 01/08/2025 08:58 AM    Hemoglobin A1C 6.3 07/09/2024 12:00 AM    BUN 13 01/08/2025 08:58 AM    Potassium 4.0 01/08/2025 08:58 AM    Chloride 100 01/08/2025 08:58 AM    CO2 29 01/08/2025 08:58 AM    Creatinine 0.96 01/08/2025 08:58 AM    AST 35 01/08/2025 08:58 AM    ALT 35 (H) 01/08/2025 08:58 AM    Protein, Total 6.9 01/08/2025 08:58 AM    Albumin 4.2 01/08/2025 08:58 AM    Globulin, Total 2.7 01/08/2025 08:58 AM           Imaging Studies: Results Review Statement: No pertinent imaging studies reviewed.    Portions of the record may have been created with voice recognition software. Occasional wrong word or \"sound a like\" substitutions may " have occurred due to the inherent limitations of voice recognition software. Read the chart carefully and recognize, using context, where substitutions have occurred.

## 2025-01-14 NOTE — PATIENT INSTRUCTIONS
Continue Synthroid 200 mcg daily.    Continue calcitriol 1 mcg daily and calcium 600 mg twice a day.     Get lab work at this time.     Contact the office with any concerns or questions, or any change in symptoms.    Follow-up in 3 months with lab work completed prior to visit.

## 2025-02-03 ENCOUNTER — TELEPHONE (OUTPATIENT)
Age: 45
End: 2025-02-03

## 2025-02-04 ENCOUNTER — OFFICE VISIT (OUTPATIENT)
Age: 45
End: 2025-02-04
Payer: COMMERCIAL

## 2025-02-04 VITALS
HEIGHT: 67 IN | DIASTOLIC BLOOD PRESSURE: 80 MMHG | TEMPERATURE: 97.1 F | HEART RATE: 104 BPM | OXYGEN SATURATION: 96 % | WEIGHT: 246.6 LBS | BODY MASS INDEX: 38.71 KG/M2 | SYSTOLIC BLOOD PRESSURE: 138 MMHG

## 2025-02-04 DIAGNOSIS — R06.83 SNORING: Primary | ICD-10-CM

## 2025-02-04 DIAGNOSIS — G47.19 EXCESSIVE DAYTIME SLEEPINESS: ICD-10-CM

## 2025-02-04 PROCEDURE — 99204 OFFICE O/P NEW MOD 45 MIN: CPT | Performed by: INTERNAL MEDICINE

## 2025-02-04 NOTE — LETTER
February 6, 2025     Cody Springer MD  [address] [place] [id number]    Patient: Chelsea Mohr   YOB: 1980   Date of Visit: 2/4/2025       Dear Dr. Springer:    Thank you for referring Chelsea Mohr to me for evaluation. Below are my notes for this consultation.    If you have questions, please do not hesitate to call me. I look forward to following your patient along with you.         Sincerely,        Darren Sanz DO        CC: No Recipients    Darren Sanz DO  2/6/2025  6:20 AM  Sign when Signing Visit  Sleep Consultation   Chelsea Mohr 44 y.o. female MRN: [id number]      Reason for consultation: MALOU management    Requesting physician: Dr. Springer    Assessment/Plan  44 y.o. F with PMHx of hypothyroidism, hypoparathyroidism, prediabetes, Possible PCOS and MALOU who comes in for management of MALOU.    1.  Severe MALOU (AHI - 35) who has never undergone treatment and now is noting worsening daytime sleepiness, memory issues and fatigue      -  repeat home study to qualify for PAP therapy      -  Follow compliance in 2-3 months      -  Discussed the results of the previous sleep study and treatment pitfalls prior to initiation.  Answered all questions regarding treatment      -  We also discussed treatment with GLP1 as well.        -  I also discussed in depth the risk of leaving sleep apnea untreated including hypertension, heart failure, arrhythmia, MI and stroke.    2.  Obesity - we discussed treatment with GLP1 such as Zepbound/Mounjaro given benefit for weight as well as for MALOU,  Will send a message to endocrinology.      3.  Shift work disorder - We discussed the detrimental effects this can have on her health.  I encouraged her to consider if it is possible to work a different shift.  If this is not possible she must optimize her sleep opportunities and minimize interruptions.  I encouraged her to minimize light exposure and optimize her sleep environment during the times she  is trying to sleep.        If she notes sleepiness during work she should use blue light and timed caffeine use to help keep her alert during her shifts.   If this worsens she can also consider timed melatonin use as well.       4.  Former smoker - 15 - 20 pack year history.  She denies any dyspnea or cough.  Will hold on any testing at this time and discuss at next visit.      History of Present Illness  HPI:  Chelsea Mohr is a 44 y.o. female with PMHx as below who comes in for management of MALOU.   She was diagnosed in 2017 but did not pursue any treatment at that time.  However, he symptoms continue to progress since that time.  She has noted more daytime sleepiness, memory and concentration issues.  She also notes a bit of irritability.    She notes  weight gain and symptoms of difficulty staying asleep, snoring, excessive daytime sleepiness with an Flynn score of 18, awakenings with gasping, witnessed apneas, morning headaches, awakenings with dry mouth.  She has noted some sleep talking.  she denies symptoms of restless legs.  she denies symptoms of cataplexy, sleep paralysis, hypnopompic or hypnagogic hallucinations.         Sleep History:  She works at night and works from 7 pm to 7 am.  she goes to bed at approximately 8:30 am, will get to sleep in a few min, will get out of bed at 3-4:30 pm.  she will get up 6 times at night breathing issues, the bathroom or feeling overheated.  It will then take a few minutes to fall back asleep.   she does nap 1-2 times during the night as well.      ROS:   Review of Systems   Constitutional:  Positive for fatigue.   Eyes: Negative.    Respiratory:  Positive for apnea and shortness of breath.    Cardiovascular: Negative.    Gastrointestinal: Negative.    Endocrine: Negative.    Genitourinary: Negative.    Musculoskeletal: Negative.    Skin: Negative.    Neurological: Negative.    Hematological: Negative.    Psychiatric/Behavioral:  Positive for sleep disturbance.               Historical Information  Past Medical History:   Diagnosis Date   • Hypoparathyroidism (HCC)    • Hypothyroidism    • Obesity    • MALOU (obstructive sleep apnea)    • Prediabetes      History reviewed. No pertinent surgical history.  Family History   Problem Relation Age of Onset   • Hypertension Mother    • Obesity Mother    • Hyperlipidemia Mother    • Diabetes unspecified Father    • Arthritis Father    • Hypertension Father    • Cancer Maternal Grandmother    • Heart disease Paternal Grandmother    • Heart disease Paternal Grandfather    • Dementia Maternal Grandfather      Social History     Socioeconomic History   • Marital status: /Civil Union     Spouse name: Not on file   • Number of children: Not on file   • Years of education: Not on file   • Highest education level: Not on file   Occupational History   • Not on file   Tobacco Use   • Smoking status: Former     Current packs/day: 0.00     Types: Cigarettes     Quit date:      Years since quittin.1   • Smokeless tobacco: Never   Vaping Use   • Vaping status: Never Used   Substance and Sexual Activity   • Alcohol use: Not Currently   • Drug use: Not Currently   • Sexual activity: Not on file   Other Topics Concern   • Not on file   Social History Narrative   • Not on file     Social Drivers of Health     Financial Resource Strain: Not on file   Food Insecurity: Not on file   Transportation Needs: Not on file   Physical Activity: Not on file   Stress: Not on file   Social Connections: Not on file   Intimate Partner Violence: Not on file   Housing Stability: Not on file       Occupational History: Nurses assistant     Meds/Allergies  Allergies   Allergen Reactions   • Amoxicillin-Pot Clavulanate GI Intolerance and Vomiting     Augmentin       Home medications:  Prior to Admission medications    Medication Sig Start Date End Date Taking? Authorizing Provider   calcitriol (ROCALTROL) 0.5 MCG capsule Take 2 capsules (1 mcg total) by  "mouth daily 1/14/25  Yes Michelet Johnston PA-C   Calcium Carbonate ([facility] CALCIUM-600 PO) Take 1,200 mg by mouth   Yes Historical Provider, MD   hydroCHLOROthiazide 25 mg tablet Take 1 tablet (25 mg total) by mouth every morning 1/14/25  Yes Michelet Johnston PA-C   ibuprofen (MOTRIN) 600 mg tablet  6/20/23  Yes Historical Provider, MD   Potassium Chloride ER 20 MEQ TBCR Take 2 tablets (40 mEq total) by mouth 2 (two) times a day 1/14/25  Yes Michelet Johnston PA-C   Synthroid 200 MCG tablet take 1 tablet by mouth daily 12/20/24  Yes Michelet Johnston PA-C       Vitals:   Blood pressure 138/80, pulse 104, temperature (!) 97.1 °F (36.2 °C), temperature source Tympanic, height 5' 7\" (1.702 m), weight 112 kg (246 lb 9.6 oz), SpO2 96%., RA, Body mass index is 38.62 kg/m².       Physical Exam  General: Pleasant, Awake alert and oriented x 3, conversant without conversational dyspnea, NAD, normal affect  HEENT:  PERRL, Sclera noninjected, nonicteric OU, Nares patent,  no craniofacial abnormalities, Mucous membranes, moist, no oral lesions, normal dentition, Mallampati class 4  NECK: Trachea midline, no accessory muscle use, no stridor, no cervical or supraclavicular adenopathy, JVP not elevated  CARDIAC: Reg, single s1/S2, no m/r/g  PULM: CTA bilaterally no wheezing, rhonchi or rales  ABD: Normoactive bowel sounds, soft nontender, nondistended, no rebound, no rigidity, no guarding  EXT: No cyanosis, no clubbing, no edema, normal capillary refill  NEURO: no focal neurologic deficits, AAOx3, moving all extremities appropriately    Labs: I have personally reviewed pertinent lab results.  No results found for: \"WBC\", \"HGB\", \"HCT\", \"MCV\", \"PLT\"   Lab Results   Component Value Date    K 4.0 01/08/2025    CO2 29 01/08/2025     01/08/2025    BUN 13 01/08/2025    CREATININE 0.96 01/08/2025       Sleep studies:  Home study: 2/5/2017 [facility] Severe - 35      DO Renata [facility] " Physician

## 2025-02-06 NOTE — PROGRESS NOTES
Sleep Consultation   Chelsea Mohr 44 y.o. female MRN: [id number]      Reason for consultation: MALOU management    Requesting physician: Dr. Springer    Assessment/Plan  44 y.o. F with PMHx of hypothyroidism, hypoparathyroidism, prediabetes, Possible PCOS and MALOU who comes in for management of MALOU.    1.  Severe MALOU (AHI - 35) who has never undergone treatment and now is noting worsening daytime sleepiness, memory issues and fatigue      -  repeat home study to qualify for PAP therapy      -  Follow compliance in 2-3 months      -  Discussed the results of the previous sleep study and treatment pitfalls prior to initiation.  Answered all questions regarding treatment      -  We also discussed treatment with GLP1 as well.        -  I also discussed in depth the risk of leaving sleep apnea untreated including hypertension, heart failure, arrhythmia, MI and stroke.    2.  Obesity - we discussed treatment with GLP1 such as Zepbound/Mounjaro given benefit for weight as well as for MALOU,  Will send a message to endocrinology.      3.  Shift work disorder - We discussed the detrimental effects this can have on her health.  I encouraged her to consider if it is possible to work a different shift.  If this is not possible she must optimize her sleep opportunities and minimize interruptions.  I encouraged her to minimize light exposure and optimize her sleep environment during the times she is trying to sleep.        If she notes sleepiness during work she should use blue light and timed caffeine use to help keep her alert during her shifts.   If this worsens she can also consider timed melatonin use as well.       4.  Former smoker - 15 - 20 pack year history.  She denies any dyspnea or cough.  Will hold on any testing at this time and discuss at next visit.      History of Present Illness   HPI:  Chelsea Mohr is a 44 y.o. female with PMHx as below who comes in for management of MALOU.   She was diagnosed in 2017 but did not pursue  any treatment at that time.  However, he symptoms continue to progress since that time.  She has noted more daytime sleepiness, memory and concentration issues.  She also notes a bit of irritability.    She notes  weight gain and symptoms of difficulty staying asleep, snoring, excessive daytime sleepiness with an Marvell score of 18, awakenings with gasping, witnessed apneas, morning headaches, awakenings with dry mouth.  She has noted some sleep talking.  she denies symptoms of restless legs.  she denies symptoms of cataplexy, sleep paralysis, hypnopompic or hypnagogic hallucinations.         Sleep History:  She works at night and works from 7 pm to 7 am.  she goes to bed at approximately 8:30 am, will get to sleep in a few min, will get out of bed at 3-4:30 pm.  she will get up 6 times at night breathing issues, the bathroom or feeling overheated.  It will then take a few minutes to fall back asleep.   she does nap 1-2 times during the night as well.      ROS:   Review of Systems   Constitutional:  Positive for fatigue.   Eyes: Negative.    Respiratory:  Positive for apnea and shortness of breath.    Cardiovascular: Negative.    Gastrointestinal: Negative.    Endocrine: Negative.    Genitourinary: Negative.    Musculoskeletal: Negative.    Skin: Negative.    Neurological: Negative.    Hematological: Negative.    Psychiatric/Behavioral:  Positive for sleep disturbance.              Historical Information   Past Medical History:   Diagnosis Date    Hypoparathyroidism (HCC)     Hypothyroidism     Obesity     MALOU (obstructive sleep apnea)     Prediabetes      History reviewed. No pertinent surgical history.  Family History   Problem Relation Age of Onset    Hypertension Mother     Obesity Mother     Hyperlipidemia Mother     Diabetes unspecified Father     Arthritis Father     Hypertension Father     Cancer Maternal Grandmother     Heart disease Paternal Grandmother     Heart disease Paternal Grandfather     Dementia  Maternal Grandfather      Social History     Socioeconomic History    Marital status: /Civil Union     Spouse name: Not on file    Number of children: Not on file    Years of education: Not on file    Highest education level: Not on file   Occupational History    Not on file   Tobacco Use    Smoking status: Former     Current packs/day: 0.00     Types: Cigarettes     Quit date:      Years since quittin.1    Smokeless tobacco: Never   Vaping Use    Vaping status: Never Used   Substance and Sexual Activity    Alcohol use: Not Currently    Drug use: Not Currently    Sexual activity: Not on file   Other Topics Concern    Not on file   Social History Narrative    Not on file     Social Drivers of Health     Financial Resource Strain: Not on file   Food Insecurity: Not on file   Transportation Needs: Not on file   Physical Activity: Not on file   Stress: Not on file   Social Connections: Not on file   Intimate Partner Violence: Not on file   Housing Stability: Not on file       Occupational History: Nurses assistant     Meds/Allergies   Allergies   Allergen Reactions    Amoxicillin-Pot Clavulanate GI Intolerance and Vomiting     Augmentin       Home medications:  Prior to Admission medications    Medication Sig Start Date End Date Taking? Authorizing Provider   calcitriol (ROCALTROL) 0.5 MCG capsule Take 2 capsules (1 mcg total) by mouth daily 25  Yes Michelet Johnston PA-C   Calcium Carbonate ([facility]S CALCIUM-600 PO) Take 1,200 mg by mouth   Yes Historical Provider, MD   hydroCHLOROthiazide 25 mg tablet Take 1 tablet (25 mg total) by mouth every morning 25  Yes Michelet Johnston PA-C   ibuprofen (MOTRIN) 600 mg tablet  23  Yes Historical Provider, MD   Potassium Chloride ER 20 MEQ TBCR Take 2 tablets (40 mEq total) by mouth 2 (two) times a day 25  Yes Michelet Johnston PA-C   Synthroid 200 MCG tablet take 1 tablet by mouth daily 24  Yes Michelet Johnston PA-C  "      Vitals:   Blood pressure 138/80, pulse 104, temperature (!) 97.1 °F (36.2 °C), temperature source Tympanic, height 5' 7\" (1.702 m), weight 112 kg (246 lb 9.6 oz), SpO2 96%., RA, Body mass index is 38.62 kg/m².       Physical Exam  General: Pleasant, Awake alert and oriented x 3, conversant without conversational dyspnea, NAD, normal affect  HEENT:  PERRL, Sclera noninjected, nonicteric OU, Nares patent,  no craniofacial abnormalities, Mucous membranes, moist, no oral lesions, normal dentition, Mallampati class 4  NECK: Trachea midline, no accessory muscle use, no stridor, no cervical or supraclavicular adenopathy, JVP not elevated  CARDIAC: Reg, single s1/S2, no m/r/g  PULM: CTA bilaterally no wheezing, rhonchi or rales  ABD: Normoactive bowel sounds, soft nontender, nondistended, no rebound, no rigidity, no guarding  EXT: No cyanosis, no clubbing, no edema, normal capillary refill  NEURO: no focal neurologic deficits, AAOx3, moving all extremities appropriately    Labs: I have personally reviewed pertinent lab results.  No results found for: \"WBC\", \"HGB\", \"HCT\", \"MCV\", \"PLT\"   Lab Results   Component Value Date    K 4.0 01/08/2025    CO2 29 01/08/2025     01/08/2025    BUN 13 01/08/2025    CREATININE 0.96 01/08/2025       Sleep studies:  Home study: 2/5/2017 [facility] Severe - 35      Darren Sanz DO  [facility] Sleep Physician    "

## 2025-02-20 LAB — HBA1C MFR BLD HPLC: 6.4 %

## 2025-02-26 ENCOUNTER — RESULTS FOLLOW-UP (OUTPATIENT)
Dept: ENDOCRINOLOGY | Facility: HOSPITAL | Age: 45
End: 2025-02-26

## 2025-02-28 ENCOUNTER — HOSPITAL ENCOUNTER (OUTPATIENT)
Facility: HOSPITAL | Age: 45
Discharge: HOME | End: 2025-02-28
Attending: INTERNAL MEDICINE
Payer: COMMERCIAL

## 2025-02-28 DIAGNOSIS — R06.83 SNORING: ICD-10-CM

## 2025-02-28 DIAGNOSIS — G47.19 EXCESSIVE DAYTIME SLEEPINESS: ICD-10-CM

## 2025-02-28 PROCEDURE — G0399 HOME SLEEP TEST/TYPE 3 PORTA: HCPCS

## 2025-02-28 NOTE — PROGRESS NOTES
Home Sleep Study Documentation    HOME STUDY DEVICE: Noxturnal no                                           Amanda G3 yes device # 26      Pre-Sleep Home Study:    Set-up and instructions performed by: Paula    Technician performed demonstration for Patient: yes    Return demonstration performed by Patient: yes    Written instructions provided to Patient: yes    Patient signed consent form: yes        Post-Sleep Home Study:    Additional comments by Patient: None    Home Sleep Study Failed:no:    Failure reason: N/A    Reported or Detected: N/A    Scored by: LUZ MARINA Holley

## 2025-03-03 ENCOUNTER — RESULTS FOLLOW-UP (OUTPATIENT)
Dept: ENDOCRINOLOGY | Facility: HOSPITAL | Age: 45
End: 2025-03-03

## 2025-03-05 ENCOUNTER — TELEPHONE (OUTPATIENT)
Dept: SLEEP CENTER | Facility: CLINIC | Age: 45
End: 2025-03-05

## 2025-03-05 NOTE — TELEPHONE ENCOUNTER
Patient of Dr. Sanz, [facility] [place].     Incoming call from patient , voicemail message left 3/5/25 @ 10:41 am looking for sleep study results.      This is Chelsea CHU as in [place]. My birthday is 1980 I picked up my at home sleep study on February 28th and returned it March 1st in the emergency department [facility] and I was wondering how long it takes for the results to be put in my chart. If you could contact me at [phone] again, that's [phone]. Thank you.      Study not resulted yet. Informed patient it can take up to 2 weeks and that she will receive a notification from [employer] that she has new test results once it is read.      Encourage patient to call back at that time with questions/concerns. And advised Nurses will reach out after study resulted.

## 2025-03-06 PROBLEM — G47.33 OSA (OBSTRUCTIVE SLEEP APNEA): Status: ACTIVE | Noted: 2025-03-06

## 2025-03-08 DIAGNOSIS — G47.33 OSA (OBSTRUCTIVE SLEEP APNEA): Primary | ICD-10-CM

## 2025-03-08 PROBLEM — G47.19 EXCESSIVE DAYTIME SLEEPINESS: Status: ACTIVE | Noted: 2025-03-08

## 2025-03-08 PROBLEM — R06.83 SNORING: Status: ACTIVE | Noted: 2025-03-08

## 2025-03-08 PROCEDURE — 95806 SLEEP STUDY UNATT&RESP EFFT: CPT | Performed by: INTERNAL MEDICINE

## 2025-03-10 ENCOUNTER — TELEPHONE (OUTPATIENT)
Age: 45
End: 2025-03-10

## 2025-03-10 NOTE — TELEPHONE ENCOUNTER
Patient is calling stating that they need a cpap machine from the results of the home study patient made appt for in house sleep study for 3/18/25 are we ordering the cpap before the in house sleep study please advise the patient

## 2025-03-10 NOTE — TELEPHONE ENCOUNTER
"Called Pt regarding her inquiry. Stated we do not have an order for a PAP Machine her so one cannot be ordered but seems the Home Study wasn't reviewed yet but the CPAP study (as Pt stated 'In House Study') will help find what pressures she will need for her PAP machine. She was also curious if she needs a PAP machine to go to the CPAP Study. MA noted she does not, the facility will have everything she needs to do it so all she needs is \"her Pj's and a Pillow\". After study, Dr. Sanz will review results and prescribe/order appropriate DME.   MA did explain that it would, be default, be ordered through [employer].   Pt expressed understanding.  "

## 2025-03-11 ENCOUNTER — RESULTS FOLLOW-UP (OUTPATIENT)
Dept: ENDOCRINOLOGY | Facility: HOSPITAL | Age: 45
End: 2025-03-11

## 2025-03-18 ENCOUNTER — HOSPITAL ENCOUNTER (OUTPATIENT)
Dept: SLEEP CENTER | Facility: CLINIC | Age: 45
Discharge: HOME | End: 2025-03-18
Payer: COMMERCIAL

## 2025-03-18 DIAGNOSIS — G47.33 OSA (OBSTRUCTIVE SLEEP APNEA): ICD-10-CM

## 2025-03-18 PROCEDURE — 95811 POLYSOM 6/>YRS CPAP 4/> PARM: CPT

## 2025-03-18 PROCEDURE — 95811 POLYSOM 6/>YRS CPAP 4/> PARM: CPT | Performed by: INTERNAL MEDICINE

## 2025-03-19 NOTE — PROGRESS NOTES
Sleep Study Documentation    Pre-Sleep Study       Sleep testing procedure explained to patient:YES    Patient napped prior to study:NO    Caffeine:Dayshift worker after 12PM.  Caffeine use:YES- soda  12 ounces    Alcohol:Dayshift workers after 5PM: Alcohol use:NO    Typical day for patient:YES       Study Documentation    Sleep Study Indications: HST with RONNELL >5    Sleep Study: Treatment   Optimal PAP pressure: 30qmL5J  Leak:Small with corrected leak early and prior to lights on  Snore:Eliminated  REM Obtained:yes  Supplemental O2: no    Minimum SaO2 57%  Baseline SaO2 93%  PAP mask tried (list all)AirFit F30 FFM  PAP mask choice (final)AirFit F30 FFM  PAP mask type:full face  PAP pressure at which snoring was eliminated [id number]  Minimum SaO2 at final PAP pressure 83%  Mode of Therapy:CPAP  ETCO2:No  CPAP changed to BiPAP:No    Mode of Therapy:CPAP    EKG abnormalities: no     EEG abnormalities: no    Were abnormal behaviors in sleep observed:NO    Is Total Sleep Study Recording Time < 2 hours: N/A    Is Total Sleep Study Recording Time > 2 hours but study is incomplete: N/A    Is Total Sleep Study Recording Time 6 hours or more but sleep was not obtained: NO    Patient classification: employed       Post-Sleep Study    Medication used at bedtime or during sleep study:YES other prescription medications    Patient reports time it took to fall asleep:30 to 60 minutes    Patient reports waking up during study:1 to 2 times.  Patient reports returning to sleep without difficulty.    Patient reports sleeping 4 to 6 hours without dreaming.    Does the Patient feel this is a typical night of sleep:better than usual    Patient rated sleepiness: Not sleepy or tired    PAP treatment:yes: Post PAP treatment patient reports feeling better and  would wear PAP mask at home.

## 2025-03-26 ENCOUNTER — OFFICE VISIT (OUTPATIENT)
Age: 45
End: 2025-03-26
Payer: COMMERCIAL

## 2025-03-26 VITALS
DIASTOLIC BLOOD PRESSURE: 82 MMHG | SYSTOLIC BLOOD PRESSURE: 118 MMHG | TEMPERATURE: 98 F | OXYGEN SATURATION: 97 % | HEART RATE: 103 BPM | WEIGHT: 243.6 LBS | BODY MASS INDEX: 38.24 KG/M2 | HEIGHT: 67 IN

## 2025-03-26 DIAGNOSIS — E66.9 OBESITY WITHOUT SERIOUS COMORBIDITY, UNSPECIFIED CLASS, UNSPECIFIED OBESITY TYPE: ICD-10-CM

## 2025-03-26 DIAGNOSIS — G47.33 OSA (OBSTRUCTIVE SLEEP APNEA): Primary | ICD-10-CM

## 2025-03-26 PROCEDURE — 99214 OFFICE O/P EST MOD 30 MIN: CPT | Performed by: INTERNAL MEDICINE

## 2025-03-27 NOTE — PROGRESS NOTES
Progress Note - Sleep Medicine  Chelsea Mohr 44 y.o. female MRN: [id number]       Impression & Plan:   44 y.o. F with PMHx of hypothyroidism, hypoparathyroidism, prediabetes, Possible PCOS and MALOU who comes in for management of MALOU.    1.  Severe MALOU (AHI - 91.8) requiring up to 14 cc of H2O pressure based on CPAP titration      -  start trial of autoCPAP 12-20 cc of H2O pressure      -   Follow compliance in 2-3 months        -  Discussed the results of the current sleep studies.        -  She is also open to trying GLP1 medication      -  I also discussed in depth the risk of leaving sleep apnea untreated including hypertension, heart failure, arrhythmia, MI and stroke.     2.  Obesity - She is due to discuss Zepbound with her endocrinologist in 1 month.       3.  Shift work disorder - We have discussed the detrimental effects this can have on her health.  She is aware and would like to switch shifts.   I encouraged her to minimize light exposure and optimize her sleep environment during the times she is trying to sleep.        If she notes sleepiness during work she should use blue light and timed caffeine use to help keep her alert during her shifts.   If this worsens she can also consider timed melatonin use as well.       4.  Former smoker - 15 - 20 pack year history.  She denies any dyspnea or cough.  Will hold on any testing at this time and discuss at next visit.      ______________________________________________________________________    HPI:    Chelsea Mohr presents today for follow-up for her MALOU.  She underwent a HST and a CPAP titration.  She states that she felt much better when she had her PAP titration.  She currently still notes significant daytime sleepiness.  She also has noted nasal congestion.  She is noted frequent awakenings from sleep.      Answers submitted by the patient for this visit:  Pulmonology Questionnaire (Submitted on 3/21/2025)  Chief Complaint: Primary symptoms  Do you have  chest tightness?: Yes  Do you have difficulty breathing?: Yes  Do you have shortness of breath?: Yes  Chronicity: chronic  When did you first notice your symptoms?: more than 1 year ago  How often do your symptoms occur?: daily  Since you first noticed this problem, how has it changed?: gradually worsening  Have you had a change in appetite?: No  Do you have chest pain?: No  Do you have shortness of breath that occurs with effort or exertion?: Yes  Do you have ear congestion?: No  Do you have ear pain?: No  Do you have a fever?: No  Do you have headaches?: Yes  Do you have heartburn?: No  Do you have fatigue?: Yes  Do you have muscle pain?: No  Do you have nasal congestion?: No  Do you have shortness of breath when lying flat?: Yes  Do you have shortness of breath when you wake up?: Yes  Do you have post-nasal drip?: No  Do you have a runny nose?: No  Do you have sneezing?: No  Do you have a sore throat?: No  Do you have sweats?: No  Do you have trouble swallowing?: No  Have you experienced weight loss?: No  Which of the following makes your symptoms worse?: nothing  Which of the following makes your symptoms better?: nothing        Social history updates:  Social History     Tobacco Use   Smoking Status Former    Current packs/day: 0.00    Types: Cigarettes    Quit date:     Years since quittin.2   Smokeless Tobacco Never     Social History     Socioeconomic History    Marital status: /Civil Union     Spouse name: Not on file    Number of children: Not on file    Years of education: Not on file    Highest education level: Not on file   Occupational History    Not on file   Tobacco Use    Smoking status: Former     Current packs/day: 0.00     Types: Cigarettes     Quit date:      Years since quittin.2    Smokeless tobacco: Never   Vaping Use    Vaping status: Never Used   Substance and Sexual Activity    Alcohol use: Not Currently    Drug use: Not Currently    Sexual activity: Not on file  "  Other Topics Concern    Not on file   Social History Narrative    Not on file     Social Drivers of Health     Financial Resource Strain: Not on file   Food Insecurity: Not on file   Transportation Needs: Not on file   Physical Activity: Not on file   Stress: Not on file   Social Connections: Not on file   Intimate Partner Violence: Not on file   Housing Stability: Not on file       PhysicalExamination:  Vitals:   /82 (BP Location: Left arm, Patient Position: Sitting, Cuff Size: Large)   Pulse 103   Temp 98 °F (36.7 °C) (Tympanic)   Ht 5' 7\" (1.702 m)   Wt 110 kg (243 lb 9.6 oz)   SpO2 97%   BMI 38.15 kg/m²   General: Pleasant, Awake alert and oriented x 3, conversant without conversational dyspnea, NAD, normal affect  HEENT:  PERRL, Sclera noninjected, nonicteric OU, Nares patent,  no craniofacial abnormalities, Mucous membranes, moist, no oral lesions, normal dentition  NECK: Trachea midline, no accessory muscle use, no stridor, no cervical or supraclavicular adenopathy, JVP not elevated  CARDIAC: Reg, single s1/S2, no m/r/g  PULM: CTA bilaterally no wheezing, rhonchi or rales  ABD: Normoactive bowel sounds, soft nontender, nondistended, no rebound, no rigidity, no guarding  EXT: No cyanosis, no clubbing, no edema, normal capillary refill  NEURO: no focal neurologic deficits, AAOx3, moving all extremities appropriately      Diagnostic Data:  Labs:  I personally reviewed the most recent laboratory data pertinent to today's visit  not applicable    I have personally reviewed pertinent lab results.  No results found for: \"WBC\", \"HGB\", \"HCT\", \"MCV\", \"PLT\"  Lab Results   Component Value Date    K 4.0 01/08/2025    CO2 29 01/08/2025     01/08/2025    BUN 13 01/08/2025    CREATININE 0.96 01/08/2025     No results found for: \"IGE\"  Lab Results   Component Value Date    ALT 35 (H) 01/08/2025    AST 35 01/08/2025     Sleep studies:  Home study: 2/5/2017 [facility] - 35    Home study  TESTING " RESULTS:  The test results are from [facility].  The total time in bed (analysis time) was 531.0 minutes.  The patient had a total of 792 respiratory events made up of 684 obstructive apneas, 52 central apneas, 13 mixed apneas and 43 hypopneas resulting in a respiratory event index (RONNELL) of 91.8.  The lowest SpO2 recorded is 48%.     IMPRESSION:  1.This test is consistent with severe obstructive sleep apnea.  2.Significant hypoxemia was noted.   3.Periods of tachycardia noted.     RECOMMENDATION:  A CPAP titration study is recommended due to sleep apnea and hypoxemia     CPAP - titration up to 14 but did well at 12 as well.        Darren Sanz DO

## 2025-03-28 LAB
DME PARACHUTE DELIVERY DATE REQUESTED: NORMAL
DME PARACHUTE DELIVERY NOTE: NORMAL
DME PARACHUTE ITEM DESCRIPTION: NORMAL
DME PARACHUTE ORDER STATUS: NORMAL
DME PARACHUTE SUPPLIER NAME: NORMAL
DME PARACHUTE SUPPLIER PHONE: NORMAL

## 2025-04-04 ENCOUNTER — TELEPHONE (OUTPATIENT)
Age: 45
End: 2025-04-04

## 2025-04-04 NOTE — TELEPHONE ENCOUNTER
Patient is calling because the insurance company will not give them their cpap machine until they have a prior auth from us can we please call the patient to find out what is needed the patient would like the cpap machine to be processed through [employer] not [employer] and they need their cpap machine.

## 2025-04-09 ENCOUNTER — DOCUMENTATION (OUTPATIENT)
Dept: PULMONOLOGY | Facility: CLINIC | Age: 45
End: 2025-04-09

## 2025-04-10 ENCOUNTER — TELEPHONE (OUTPATIENT)
Dept: PULMONOLOGY | Facility: CLINIC | Age: 45
End: 2025-04-10

## 2025-04-15 DIAGNOSIS — R73.03 PREDIABETES: Primary | ICD-10-CM

## 2025-04-15 RX ORDER — METFORMIN HYDROCHLORIDE 500 MG/1
500 TABLET, EXTENDED RELEASE ORAL
Qty: 90 TABLET | Refills: 1 | Status: SHIPPED | OUTPATIENT
Start: 2025-04-15

## 2025-04-24 ENCOUNTER — OFFICE VISIT (OUTPATIENT)
Dept: ENDOCRINOLOGY | Facility: HOSPITAL | Age: 45
End: 2025-04-24
Payer: COMMERCIAL

## 2025-04-24 VITALS
HEIGHT: 67 IN | DIASTOLIC BLOOD PRESSURE: 90 MMHG | SYSTOLIC BLOOD PRESSURE: 124 MMHG | WEIGHT: 250.2 LBS | HEART RATE: 92 BPM | BODY MASS INDEX: 39.27 KG/M2

## 2025-04-24 DIAGNOSIS — E89.0 POSTOPERATIVE HYPOTHYROIDISM: Primary | ICD-10-CM

## 2025-04-24 DIAGNOSIS — R73.03 PREDIABETES: ICD-10-CM

## 2025-04-24 DIAGNOSIS — E89.2 HYPOPARATHYROIDISM AFTER PROCEDURE (HCC): ICD-10-CM

## 2025-04-24 PROCEDURE — 99214 OFFICE O/P EST MOD 30 MIN: CPT | Performed by: PHYSICIAN ASSISTANT

## 2025-04-24 NOTE — ASSESSMENT & PLAN NOTE
Calcium and PTH within normal range.  Continue with calcium 1200 mg daily and calcitriol 1 mcg daily.  Orders:  •  Vitamin D 25 hydroxy; Future  •  PTH, intact; Future

## 2025-04-24 NOTE — ASSESSMENT & PLAN NOTE
Most recent thyroid lab work came back normal.  Clinically and biochemically euthyroid.  At this time she will continue with Synthroid 200 mcg daily.  Contact the office with any concerns or questions.  Follow-up in 3 months with labwork completed prior to visit.  Orders:  •  TSH, 3rd generation; Future  •  T4, free; Future

## 2025-04-24 NOTE — PATIENT INSTRUCTIONS
Continue Synthroid 200 mcg daily.    Continue metformin 500 mg daily.    Continue calcitriol 1 mcg daily and calcium 600 mg twice a day.     Get lab work at this time.     Contact the office with any concerns or questions, or any change in symptoms.    Follow-up in 3 months with lab work completed prior to visit.

## 2025-04-24 NOTE — ASSESSMENT & PLAN NOTE
A1c remains stable at 6.4.  She did start utilizing metformin about 2 weeks ago and currently has not had any significant complications with medications.  She will continue working on lifestyle to help improve glucose levels.  Did encourage checking blood sugars at least daily to see how things are trending.  Orders:  •  Comprehensive metabolic panel; Future  •  Hemoglobin A1C; Future

## 2025-04-24 NOTE — PROGRESS NOTES
Name: Chelsea Mohr      : 1980      MRN: [id number]  Encounter Provider: Michelet Johnston PA-C  Encounter Date: 2025   Encounter department: [facility]    No chief complaint on file.  :  Assessment & Plan  Postoperative hypothyroidism  Most recent thyroid lab work came back normal.  Clinically and biochemically euthyroid.  At this time she will continue with Synthroid 200 mcg daily.  Contact the office with any concerns or questions.  Follow-up in 3 months with labwork completed prior to visit.  Orders:  •  TSH, 3rd generation; Future  •  T4, free; Future    Hypoparathyroidism after procedure (HCC)  Calcium and PTH within normal range.  Continue with calcium 1200 mg daily and calcitriol 1 mcg daily.  Orders:  •  Vitamin D 25 hydroxy; Future  •  PTH, intact; Future    Prediabetes  A1c remains stable at 6.4.  She did start utilizing metformin about 2 weeks ago and currently has not had any significant complications with medications.  She will continue working on lifestyle to help improve glucose levels.  Did encourage checking blood sugars at least daily to see how things are trending.  Orders:  •  Comprehensive metabolic panel; Future  •  Hemoglobin A1C; Future        History of Present Illness     Chelsea Mohr is a 44 y.o. female with hypothyroidism after thyroid surgery for treatment of Graves disease who presents for a follow-up appointment.  She also has postprocedural hypoparathyroidism.  For hypothyroidism she continues on Synthroid 200 mcg daily.  For hypoparathyroidism she is maintained on calcitriol 1 mcg daily as well as calcium 600 mg twice a day and hydrochlorothiazide 25 mg daily.  She presents today overall feeling okay.  Has been diagnosed with prediabetes.  Does have a family history of diabetes.  Was increasing physical activity through walking and hiking.  Has been trying to decrease calorie intake and carbs in diet.  Has realized  that she does need to work out to overall improve her health.  She did lose weight after last office visit, but has started gaining it back.  Denies any increasing fatigue, heat or cold intolerance, palpitations, abdominal pain, diarrhea, tremors, sweating, muscle aches or weakness.  Denies any headaches, vision changes, chest pain.  Decently periods have been further apart.  In general states that her cycles have not been regular.  Was recently diagnosed with sleep apnea and was placed on CPAP and states that she is feeling much better at this time.  Fatigue has significantly improved.  We did start her on metformin in between office visits.  She does have a history of prediabetes and insulin resistance and noticed increased hunger recently was interested in starting metformin.  At this time she is taking metformin 500 mg daily.  Has not noticed any significant complications with medication.  States medication has helped with her appetite.        Review of Systems   Constitutional:  Positive for unexpected weight change. Negative for activity change, appetite change, diaphoresis and fatigue.   HENT:  Negative for sore throat, trouble swallowing and voice change.    Eyes:  Negative for visual disturbance.   Respiratory:  Negative for chest tightness and shortness of breath.    Cardiovascular:  Negative for chest pain, palpitations and leg swelling.   Gastrointestinal:  Negative for abdominal pain, constipation and diarrhea.   Endocrine: Negative for cold intolerance, heat intolerance, polydipsia, polyphagia and polyuria.   Genitourinary:  Positive for menstrual problem.   Skin:  Negative for rash.   Neurological:  Negative for dizziness, tremors, light-headedness, numbness and headaches.   Hematological:  Negative for adenopathy.   Psychiatric/Behavioral:  Negative for dysphoric mood and sleep disturbance. The patient is not nervous/anxious.     as per HPI  Medical History Reviewed by provider this encounter:      ".  Current Outpatient Medications on File Prior to Visit   Medication Sig Dispense Refill   • calcitriol (ROCALTROL) 0.5 MCG capsule Take 2 capsules (1 mcg total) by mouth daily 60 capsule 11   • Calcium Carbonate ([employer] CALCIUM-600 PO) Take 1,200 mg by mouth     • hydroCHLOROthiazide 25 mg tablet Take 1 tablet (25 mg total) by mouth every morning 90 tablet 3   • ibuprofen (MOTRIN) 600 mg tablet      • metFORMIN (GLUCOPHAGE-XR) 500 mg 24 hr tablet Take 1 tablet (500 mg total) by mouth daily with breakfast 90 tablet 1   • Potassium Chloride ER 20 MEQ TBCR Take 2 tablets (40 mEq total) by mouth 2 (two) times a day 120 tablet 11   • Synthroid 200 MCG tablet take 1 tablet by mouth daily 30 tablet 5     No current facility-administered medications on file prior to visit.      Social History     Tobacco Use   • Smoking status: Former     Current packs/day: 0.00     Average packs/day: 0.5 packs/day for 10.0 years (5.0 ttl pk-yrs)     Types: Cigarettes     Quit date:      Years since quittin.3   • Smokeless tobacco: Never   Vaping Use   • Vaping status: Never Used   Substance and Sexual Activity   • Alcohol use: Yes     Alcohol/week: 1.0 standard drink of alcohol     Types: 1 Standard drinks or equivalent per week   • Drug use: Not Currently   • Sexual activity: Yes     Partners: Male     Birth control/protection: Condom Male        Medical History Reviewed by provider this encounter:     .    Objective   /90   Pulse 92   Ht 5' 7\" (1.702 m)   Wt 113 kg (250 lb 3.2 oz)   BMI 39.19 kg/m²      Body mass index is 39.19 kg/m².  Wt Readings from Last 3 Encounters:   25 113 kg (250 lb 3.2 oz)   25 110 kg (243 lb 9.6 oz)   25 112 kg (246 lb 9.6 oz)     Physical Exam  Vitals and nursing note reviewed.   Constitutional:       General: She is not in acute distress.     Appearance: Normal appearance. She is well-developed. She is not diaphoretic.   Eyes:      General: No scleral icterus.     " "Extraocular Movements: Extraocular movements intact.      Conjunctiva/sclera: Conjunctivae normal.      Pupils: Pupils are equal, round, and reactive to light.   Cardiovascular:      Rate and Rhythm: Normal rate and regular rhythm.      Pulses: Normal pulses.      Heart sounds: Normal heart sounds. No murmur heard.     No friction rub. No gallop.   Pulmonary:      Effort: Pulmonary effort is normal. No tachypnea, bradypnea or respiratory distress.      Breath sounds: Normal breath sounds. No wheezing.   Musculoskeletal:      Cervical back: Normal range of motion.      Right lower leg: No edema.      Left lower leg: No edema.   Lymphadenopathy:      Cervical: No cervical adenopathy.   Skin:     General: Skin is warm and dry.   Neurological:      Mental Status: She is alert and oriented to person, place, and time. Mental status is at baseline. She is not disoriented.      Motor: No abnormal muscle tone.      Gait: Gait normal.      Deep Tendon Reflexes: Reflexes are normal and symmetric.   Psychiatric:         Mood and Affect: Mood normal.         Behavior: Behavior normal.         Thought Content: Thought content normal.         Labs:   Lab Results   Component Value Date    HGBA1C 6.4 02/20/2025    HGBA1C 6.4 (H) 01/08/2025    HGBA1C 6.3 07/09/2024     Lab Results   Component Value Date    CREATININE 0.96 01/08/2025    CREATININE 0.89 06/15/2023    CREATININE 0.88 12/20/2022    BUN 13 01/08/2025    K 4.0 01/08/2025     01/08/2025    CO2 29 01/08/2025     eGFR   Date Value Ref Range Status   01/08/2025 75 >59 mL/min/1.73 Final     No results found for: \"CHOL\", \"HDL\", \"LDL\", \"TRIG\", \"CHOLHDL\"  Lab Results   Component Value Date    ALT 35 (H) 01/08/2025    AST 35 01/08/2025     No results found for: \"[id number]\"  Lab Results   Component Value Date    FREET4 1.60 01/08/2025       Patient Instructions   Continue Synthroid 200 mcg daily.    Continue metformin 500 mg daily.    Continue calcitriol 1 mcg daily and " calcium 600 mg twice a day.     Get lab work at this time.     Contact the office with any concerns or questions, or any change in symptoms.    Follow-up in 3 months with lab work completed prior to visit.    Discussed with the patient and all questioned fully answered. She will call me if any problems arise.

## 2025-05-31 DIAGNOSIS — E89.0 POSTOPERATIVE HYPOTHYROIDISM: ICD-10-CM

## 2025-06-02 RX ORDER — LEVOTHYROXINE SODIUM 200 MCG
200 TABLET ORAL DAILY
Qty: 30 TABLET | Refills: 0 | OUTPATIENT
Start: 2025-06-02

## 2025-06-02 RX ORDER — LEVOTHYROXINE SODIUM 200 MCG
TABLET ORAL
Qty: 30 TABLET | Refills: 5 | Status: SHIPPED | OUTPATIENT
Start: 2025-06-02

## 2025-07-07 ENCOUNTER — OFFICE VISIT (OUTPATIENT)
Age: 45
End: 2025-07-07
Payer: COMMERCIAL

## 2025-07-07 VITALS
OXYGEN SATURATION: 98 % | SYSTOLIC BLOOD PRESSURE: 132 MMHG | TEMPERATURE: 98.3 F | WEIGHT: 247 LBS | BODY MASS INDEX: 38.77 KG/M2 | HEART RATE: 83 BPM | DIASTOLIC BLOOD PRESSURE: 100 MMHG | HEIGHT: 67 IN

## 2025-07-07 DIAGNOSIS — R06.09 DYSPNEA ON EXERTION: Primary | ICD-10-CM

## 2025-07-07 DIAGNOSIS — Z87.891 FORMER SMOKER: ICD-10-CM

## 2025-07-07 DIAGNOSIS — G47.33 OSA (OBSTRUCTIVE SLEEP APNEA): ICD-10-CM

## 2025-07-07 DIAGNOSIS — G47.26 SHIFT WORK SLEEP DISORDER: ICD-10-CM

## 2025-07-07 PROBLEM — R06.83 SNORING: Status: RESOLVED | Noted: 2025-03-08 | Resolved: 2025-07-07

## 2025-07-07 PROCEDURE — 99214 OFFICE O/P EST MOD 30 MIN: CPT | Performed by: INTERNAL MEDICINE

## 2025-07-07 RX ORDER — ALBUTEROL SULFATE 90 UG/1
2 INHALANT RESPIRATORY (INHALATION) EVERY 6 HOURS PRN
Qty: 18 G | Refills: 3 | Status: SHIPPED | OUTPATIENT
Start: 2025-07-07

## 2025-07-07 NOTE — LETTER
July 7, 2025     Cody Springer MD  [facility] [address]    Patient: Chelsea Mohr   YOB: 1980   Date of Visit: 7/7/2025       Dear Dr. Cody Springer MD:    Thank you for referring Chelsea Mohr to me for evaluation. Below are my notes for this consultation.    If you have questions, please do not hesitate to call me. I look forward to following your patient along with you.         Sincerely,        Darren Sanz DO        CC: No Recipients    Darren Sanz DO  7/7/2025  8:51 PM  Sign when Signing Visit  Progress Note - Sleep Medicine  Chelsea Mohr 44 y.o. female MRN: [id number]       Impression & Plan:   44 y.o. F with PMHx of hypothyroidism, hypoparathyroidism, prediabetes, Possible PCOS and MALOU who comes in for management of MALOU.    1.  Severe MALOU (AHI - 91.8) and severe hypoxia.  She has excellent compliance and great clinical response with autoCPAP.   Residual AHI - 2.3 (DME - [facility], machine - Resmed Airsense 11).        -  continue autoCPAP 12-20 cc of H2O pressure      -   She has started on tirzepatide so it is likely with weight loss her PAP pressure requirement will come down.      -  She may benefit from BIPAP in the future due to high pressure requirement and potential need for ventilation in the future.        -  Discussed the compliance results and answered all questions.         -  She is aware of the risk of leaving sleep apnea untreated including hypertension, heart failure, arrhythmia, MI and stroke.     2.  Obesity - She is currently on zepbound.  Follow weight loss and pressure requirement.       3.  Shift work disorder - she is aware of the detrimental effects this can have on her health.  She is aware and would like to switch shifts.   She is aware to minimize light exposure and optimize her sleep environment during the times she is trying to sleep.  If she notes sleepiness during work she should use blue light and timed caffeine use to  help keep her alert during her shifts.   If this worsens she can also consider timed melatonin use as well.       4.  Dyspnea on exertion - multifactorial.   Restriction from obesity may be most likely cause.  However, she is also a former smoker - 15 - 20 pack year history.        -  Check PFTs to assess for obstruction vs. Restriction.    ______________________________________________________________________    HPI:    Chelsea Mohr presents today for follow-up for her MALOU.  She has been on autoPAP and has noted a tremendous benefit with autoCPAP.  She feels more refreshed and more alert.   She is tolerating CPAP without difficulty.   Daytime sleepiness has improved and now [place] is down to 6.  She has noted chest tightness, cough and wheezing.      Pulmonology Questionnaire (Submitted on 7/6/2025)  Chief Complaint: Primary symptoms  When did you first notice your symptoms?: more than 1 year ago  How often do your symptoms occur?: constantly  Since you first noticed this problem, how has it changed?: resolved  Do you have shortness of breath that occurs with effort or exertion?: Yes  Do you have ear congestion?: No  Do you have heartburn?: No  Do you have fatigue?: No  Do you have nasal congestion?: No  Do you have shortness of breath when lying flat?: No  Do you have shortness of breath when you wake up?: No  Do you have sweats?: No  Have you experienced weight loss?: No  Which of the following makes your symptoms worse?: strenuous activity  Which of the following makes your symptoms better?: nothing      Review of Systems:  Review of Systems   Constitutional:  Negative for appetite change and fever.   HENT:  Positive for postnasal drip and rhinorrhea. Negative for ear pain, sneezing, sore throat and trouble swallowing.    Eyes: Negative.    Cardiovascular:  Negative for chest pain.   Gastrointestinal: Negative.    Endocrine: Negative.    Genitourinary: Negative.    Musculoskeletal:  Negative for myalgias.  "  Neurological:  Negative for headaches.   Hematological: Negative.    Psychiatric/Behavioral: Negative.           Social history updates:  Tobacco Use History[1]  Social History     Socioeconomic History   • Marital status: /Civil Union     Spouse name: Not on file   • Number of children: Not on file   • Years of education: Not on file   • Highest education level: Not on file   Occupational History   • Not on file   Tobacco Use   • Smoking status: Former     Current packs/day: 0.00     Average packs/day: 0.5 packs/day for 10.0 years (5.0 ttl pk-yrs)     Types: Cigarettes     Quit date:      Years since quittin.5   • Smokeless tobacco: Never   Vaping Use   • Vaping status: Never Used   Substance and Sexual Activity   • Alcohol use: Yes     Alcohol/week: 1.0 standard drink of alcohol     Types: 1 Standard drinks or equivalent per week   • Drug use: Not Currently   • Sexual activity: Yes     Partners: Male     Birth control/protection: Condom Male   Other Topics Concern   • Not on file   Social History Narrative   • Not on file     Social Drivers of Health     Financial Resource Strain: Not on file   Food Insecurity: Not on file   Transportation Needs: Not on file   Physical Activity: Not on file   Stress: Not on file   Social Connections: Not on file   Intimate Partner Violence: Not on file   Housing Stability: Not on file       PhysicalExamination:  Vitals:   /100 (BP Location: Left arm, Patient Position: Sitting, Cuff Size: Large)   Pulse 83   Temp 98.3 °F (36.8 °C) (Tympanic Core)   Ht 5' 7\" (1.702 m)   Wt 112 kg (247 lb)   SpO2 98%   BMI 38.69 kg/m²   General: Pleasant, Awake alert and oriented x 3, conversant without conversational dyspnea, NAD, normal affect  HEENT:  PERRL, Sclera noninjected, nonicteric OU, Nares patent,  no craniofacial abnormalities, Mucous membranes, moist, no oral lesions, normal dentition  NECK: Trachea midline, no accessory muscle use, no stridor, no cervical " "or supraclavicular adenopathy, JVP not elevated  CARDIAC: Reg, single s1/S2, no m/r/g  PULM: CTA bilaterally no wheezing, rhonchi or rales  ABD: Normoactive bowel sounds, soft nontender, nondistended, no rebound, no rigidity, no guarding  EXT: No cyanosis, no clubbing, no edema, normal capillary refill  NEURO: no focal neurologic deficits, AAOx3, moving all extremities appropriately      Diagnostic Data:  Labs:  I personally reviewed the most recent laboratory data pertinent to today's visit  not applicable    I have personally reviewed pertinent lab results.  No results found for: \"WBC\", \"HGB\", \"HCT\", \"MCV\", \"PLT\"  Lab Results   Component Value Date    K 4.0 01/08/2025    CO2 29 01/08/2025     01/08/2025    BUN 13 01/08/2025    CREATININE 0.96 01/08/2025     No results found for: \"IGE\"  Lab Results   Component Value Date    ALT 35 (H) 01/08/2025    AST 35 01/08/2025     Sleep studies:  Home study: 2/5/2017 [facility] Severe - 35     Home study  TESTING RESULTS:  The test results are from [facility].  The total time in bed (analysis time) was 531.0 minutes.  The patient had a total of 792 respiratory events made up of 684 obstructive apneas, 52 central apneas, 13 mixed apneas and 43 hypopneas resulting in a respiratory event index (RONNELL) of 91.8.  The lowest SpO2 recorded is 48%.     IMPRESSION:  1.This test is consistent with severe obstructive sleep apnea.  2.Significant hypoxemia was noted.   3.Periods of tachycardia noted.     RECOMMENDATION:  A CPAP titration study is recommended due to sleep apnea and hypoxemia     CPAP - titration up to 14 but did well at 12 as well.        New Compliance Data:  6/7/25 - 7/6/25                                  Type of CPAP:  autoPAP 12-20, mean 15.4, max 19.9                                   Percent usage: 100%, > 4 hrs 97%                                   Average time used: 8 hrs 7 minutes                                   Residual AHI: 2.3                                   "     Darren Sanz DO         [1]   Social History  Tobacco Use   Smoking Status Former   • Current packs/day: 0.00   • Average packs/day: 0.5 packs/day for 10.0 years (5.0 ttl pk-yrs)   • Types: Cigarettes   • Quit date:    • Years since quittin.5   Smokeless Tobacco Never

## 2025-07-08 NOTE — PROGRESS NOTES
Progress Note - Sleep Medicine  Chelsea Mohr 44 y.o. female MRN: [id number]       Impression & Plan:   44 y.o. F with PMHx of hypothyroidism, hypoparathyroidism, prediabetes, Possible PCOS and MALOU who comes in for management of MALOU.    1.  Severe MALOU (AHI - 91.8) and severe hypoxia.  She has excellent compliance and great clinical response with autoCPAP.   Residual AHI - 2.3 (DME - [facility], machine - Resmed Airsense 11).        -  continue autoCPAP 12-20 cc of H2O pressure      -   She has started on tirzepatide so it is likely with weight loss her PAP pressure requirement will come down.      -  She may benefit from BIPAP in the future due to high pressure requirement and potential need for ventilation in the future.        -  Discussed the compliance results and answered all questions.         -  She is aware of the risk of leaving sleep apnea untreated including hypertension, heart failure, arrhythmia, MI and stroke.     2.  Obesity - She is currently on zepbound.  Follow weight loss and pressure requirement.       3.  Shift work disorder - she is aware of the detrimental effects this can have on her health.  She is aware and would like to switch shifts.   She is aware to minimize light exposure and optimize her sleep environment during the times she is trying to sleep.  If she notes sleepiness during work she should use blue light and timed caffeine use to help keep her alert during her shifts.   If this worsens she can also consider timed melatonin use as well.       4.  Dyspnea on exertion - multifactorial.   Restriction from obesity may be most likely cause.  However, she is also a former smoker - 15 - 20 pack year history.        -  Check PFTs to assess for obstruction vs. Restriction.    ______________________________________________________________________    HPI:    Chelsea Mohr presents today for follow-up for her MALOU.  She has been on autoPAP and has noted a tremendous benefit with autoCPAP.  She  feels more refreshed and more alert.   She is tolerating CPAP without difficulty.   Daytime sleepiness has improved and now [place] is down to 6.  She has noted chest tightness, cough and wheezing.      Pulmonology Questionnaire (Submitted on 7/6/2025)  Chief Complaint: Primary symptoms  When did you first notice your symptoms?: more than 1 year ago  How often do your symptoms occur?: constantly  Since you first noticed this problem, how has it changed?: resolved  Do you have shortness of breath that occurs with effort or exertion?: Yes  Do you have ear congestion?: No  Do you have heartburn?: No  Do you have fatigue?: No  Do you have nasal congestion?: No  Do you have shortness of breath when lying flat?: No  Do you have shortness of breath when you wake up?: No  Do you have sweats?: No  Have you experienced weight loss?: No  Which of the following makes your symptoms worse?: strenuous activity  Which of the following makes your symptoms better?: nothing      Review of Systems:  Review of Systems   Constitutional:  Negative for appetite change and fever.   HENT:  Positive for postnasal drip and rhinorrhea. Negative for ear pain, sneezing, sore throat and trouble swallowing.    Eyes: Negative.    Cardiovascular:  Negative for chest pain.   Gastrointestinal: Negative.    Endocrine: Negative.    Genitourinary: Negative.    Musculoskeletal:  Negative for myalgias.   Neurological:  Negative for headaches.   Hematological: Negative.    Psychiatric/Behavioral: Negative.           Social history updates:  Tobacco Use History[1]  Social History     Socioeconomic History    Marital status: /Civil Union     Spouse name: Not on file    Number of children: Not on file    Years of education: Not on file    Highest education level: Not on file   Occupational History    Not on file   Tobacco Use    Smoking status: Former     Current packs/day: 0.00     Average packs/day: 0.5 packs/day for 10.0 years (5.0 ttl pk-yrs)      "Types: Cigarettes     Quit date:      Years since quittin.5    Smokeless tobacco: Never   Vaping Use    Vaping status: Never Used   Substance and Sexual Activity    Alcohol use: Yes     Alcohol/week: 1.0 standard drink of alcohol     Types: 1 Standard drinks or equivalent per week    Drug use: Not Currently    Sexual activity: Yes     Partners: Male     Birth control/protection: Condom Male   Other Topics Concern    Not on file   Social History Narrative    Not on file     Social Drivers of Health     Financial Resource Strain: Not on file   Food Insecurity: Not on file   Transportation Needs: Not on file   Physical Activity: Not on file   Stress: Not on file   Social Connections: Not on file   Intimate Partner Violence: Not on file   Housing Stability: Not on file       PhysicalExamination:  Vitals:   /100 (BP Location: Left arm, Patient Position: Sitting, Cuff Size: Large)   Pulse 83   Temp 98.3 °F (36.8 °C) (Tympanic Core)   Ht 5' 7\" (1.702 m)   Wt 112 kg (247 lb)   SpO2 98%   BMI 38.69 kg/m²   General: Pleasant, Awake alert and oriented x 3, conversant without conversational dyspnea, NAD, normal affect  HEENT:  PERRL, Sclera noninjected, nonicteric OU, Nares patent,  no craniofacial abnormalities, Mucous membranes, moist, no oral lesions, normal dentition  NECK: Trachea midline, no accessory muscle use, no stridor, no cervical or supraclavicular adenopathy, JVP not elevated  CARDIAC: Reg, single s1/S2, no m/r/g  PULM: CTA bilaterally no wheezing, rhonchi or rales  ABD: Normoactive bowel sounds, soft nontender, nondistended, no rebound, no rigidity, no guarding  EXT: No cyanosis, no clubbing, no edema, normal capillary refill  NEURO: no focal neurologic deficits, AAOx3, moving all extremities appropriately      Diagnostic Data:  Labs:  I personally reviewed the most recent laboratory data pertinent to today's visit  not applicable    I have personally reviewed pertinent lab results.  No " "results found for: \"WBC\", \"HGB\", \"HCT\", \"MCV\", \"PLT\"  Lab Results   Component Value Date    K 4.0 2025    CO2 29 2025     2025    BUN 13 2025    CREATININE 0.96 2025     No results found for: \"IGE\"  Lab Results   Component Value Date    ALT 35 (H) 2025    AST 35 2025     Sleep studies:  Home study: 2017 [place] Severe - 35     Home study  TESTING RESULTS:  The test results are from [facility].  The total time in bed (analysis time) was 531.0 minutes.  The patient had a total of 792 respiratory events made up of 684 obstructive apneas, 52 central apneas, 13 mixed apneas and 43 hypopneas resulting in a respiratory event index (RONNELL) of 91.8.  The lowest SpO2 recorded is 48%.     IMPRESSION:  1.This test is consistent with severe obstructive sleep apnea.  2.Significant hypoxemia was noted.   3.Periods of tachycardia noted.     RECOMMENDATION:  A CPAP titration study is recommended due to sleep apnea and hypoxemia     CPAP - titration up to 14 but did well at 12 as well.        New Compliance Data:  25 - 25                                  Type of CPAP:  autoPAP 12-20, mean 15.4, max 19.9                                   Percent usage: 100%, > 4 hrs 97%                                   Average time used: 8 hrs 7 minutes                                   Residual AHI: 2.3                                       Darren Sanz DO         [1]   Social History  Tobacco Use   Smoking Status Former    Current packs/day: 0.00    Average packs/day: 0.5 packs/day for 10.0 years (5.0 ttl pk-yrs)    Types: Cigarettes    Quit date:     Years since quittin.5   Smokeless Tobacco Never     "

## 2025-07-23 ENCOUNTER — APPOINTMENT (OUTPATIENT)
Age: 45
End: 2025-07-23
Payer: COMMERCIAL

## 2025-07-23 DIAGNOSIS — R73.03 PREDIABETES: ICD-10-CM

## 2025-07-23 DIAGNOSIS — E89.2 HYPOPARATHYROIDISM AFTER PROCEDURE (HCC): ICD-10-CM

## 2025-07-23 DIAGNOSIS — E89.0 POSTOPERATIVE HYPOTHYROIDISM: ICD-10-CM

## 2025-07-23 LAB
25(OH)D3 SERPL-MCNC: 46.2 NG/ML (ref 30–100)
ALBUMIN SERPL BCG-MCNC: 3.9 G/DL (ref 3.5–5)
ALP SERPL-CCNC: 67 U/L (ref 34–104)
ALT SERPL W P-5'-P-CCNC: 32 U/L (ref 7–52)
ANION GAP SERPL CALCULATED.3IONS-SCNC: 10 MMOL/L (ref 4–13)
AST SERPL W P-5'-P-CCNC: 30 U/L (ref 13–39)
BILIRUB SERPL-MCNC: 0.42 MG/DL (ref 0.2–1)
BUN SERPL-MCNC: 16 MG/DL (ref 5–25)
CALCIUM SERPL-MCNC: 8.4 MG/DL (ref 8.4–10.2)
CHLORIDE SERPL-SCNC: 101 MMOL/L (ref 96–108)
CO2 SERPL-SCNC: 30 MMOL/L (ref 21–32)
CREAT SERPL-MCNC: 0.8 MG/DL (ref 0.6–1.3)
EST. AVERAGE GLUCOSE BLD GHB EST-MCNC: 134 MG/DL
GFR SERPL CREATININE-BSD FRML MDRD: 89 ML/MIN/1.73SQ M
GLUCOSE P FAST SERPL-MCNC: 116 MG/DL (ref 65–99)
HBA1C MFR BLD: 6.3 %
POTASSIUM SERPL-SCNC: 3.9 MMOL/L (ref 3.5–5.3)
PROT SERPL-MCNC: 6.6 G/DL (ref 6.4–8.4)
PTH-INTACT SERPL-MCNC: 3.7 PG/ML (ref 12–88)
SODIUM SERPL-SCNC: 141 MMOL/L (ref 135–147)
T4 FREE SERPL-MCNC: 1.15 NG/DL (ref 0.61–1.12)
TSH SERPL DL<=0.05 MIU/L-ACNC: 0.28 UIU/ML (ref 0.45–4.5)

## 2025-07-23 PROCEDURE — 80053 COMPREHEN METABOLIC PANEL: CPT

## 2025-07-23 PROCEDURE — 83036 HEMOGLOBIN GLYCOSYLATED A1C: CPT

## 2025-07-23 PROCEDURE — 84443 ASSAY THYROID STIM HORMONE: CPT

## 2025-07-23 PROCEDURE — 82306 VITAMIN D 25 HYDROXY: CPT

## 2025-07-23 PROCEDURE — 83970 ASSAY OF PARATHORMONE: CPT

## 2025-07-23 PROCEDURE — 36415 COLL VENOUS BLD VENIPUNCTURE: CPT

## 2025-07-23 PROCEDURE — 84439 ASSAY OF FREE THYROXINE: CPT

## 2025-07-25 ENCOUNTER — OFFICE VISIT (OUTPATIENT)
Dept: ENDOCRINOLOGY | Facility: HOSPITAL | Age: 45
End: 2025-07-25
Payer: COMMERCIAL

## 2025-07-25 VITALS
DIASTOLIC BLOOD PRESSURE: 94 MMHG | SYSTOLIC BLOOD PRESSURE: 134 MMHG | HEIGHT: 67 IN | WEIGHT: 246 LBS | HEART RATE: 80 BPM | BODY MASS INDEX: 38.61 KG/M2

## 2025-07-25 DIAGNOSIS — R73.03 PREDIABETES: ICD-10-CM

## 2025-07-25 DIAGNOSIS — E89.0 POSTOPERATIVE HYPOTHYROIDISM: Primary | ICD-10-CM

## 2025-07-25 DIAGNOSIS — E89.2 HYPOPARATHYROIDISM AFTER PROCEDURE (HCC): ICD-10-CM

## 2025-07-25 PROCEDURE — 99214 OFFICE O/P EST MOD 30 MIN: CPT | Performed by: PHYSICIAN ASSISTANT

## 2025-07-25 NOTE — PATIENT INSTRUCTIONS
Continue Synthroid 200 mcg daily.     Continue metformin 500 mg daily.    Continue calcitriol 1 mcg daily and calcium 600 mg twice a day.     Contact the office with any concerns or questions, or any change in symptoms.    Get lab work in about 3 months.    Follow-up in 6 months with lab work completed prior to visit.

## 2025-07-25 NOTE — PROGRESS NOTES
Name: Chelsea Mohr      : 1980      MRN: [id number]  Encounter Provider: Michelet Johnston PA-C  Encounter Date: 2025   Encounter department: [facility]    No chief complaint on file.  :  Assessment & Plan  Postoperative hypothyroidism  Most recent thyroid lab work came back with a normal free T4 and a slightly low TSH.  Clinically euthyroid at this time.  Will not make any adjustments to her Synthroid and she will continue 200 mcg daily.  Since she plans on following up with [employer] weight management, she is hoping to lose some weight which we will likely have to back off on her Synthroid.  Did discuss symptoms of hyper and hypothyroidism.  If there is any concerns, please contact the office.  At this time we will have her repeat lab work in 3 months with a follow-up appointment in 6 months.  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  At this time she will continue with calcitriol 1 mcg daily and calcium 600 mg twice a day.  Orders:  •  Comprehensive metabolic panel; Future  •  Comprehensive metabolic panel; Future  •  PTH, intact; Future  •  Vitamin D 25 hydroxy; Future    Prediabetes  A1c did decrease slightly.  Does admit to some increased stress and dietary indiscretion as she does work for [employer] and she has been transitioning to work with [employer].  That being said she does plan on following up with [employer] weight management in the future to hopefully help with her weight which should also help with her prediabetes.  At this time she will continue with metformin 500 mg daily.  Orders:  •  Hemoglobin A1C; Future  •  Hemoglobin A1C; Future      History of Present Illness     Chelsea Mohr is a 44 y.o. female with hypothyroidism after thyroid surgery for  treatment of Graves disease who presents for a follow-up appointment.  She also has postprocedural hypoparathyroidism.  For hypothyroidism she continues on Synthroid 200 mcg daily.  For hypoparathyroidism she is maintained on calcitriol 1 mcg daily as well as calcium 600 mg twice a day and hydrochlorothiazide 25 mg daily.  She presents today overall feeling okay.  Has been diagnosed with prediabetes.  Does have a family history of diabetes.  Was increasing physical activity through walking and hiking.  Has been trying to decrease calorie intake and carbs in diet.  Has realized that she does need to work out to overall improve her health.  She did lose weight after last office visit, but has started gaining it back.  Denies any increasing fatigue, heat or cold intolerance, palpitations, abdominal pain, diarrhea, tremors, sweating, muscle aches or weakness.  Denies any headaches, vision changes, chest pain.  Decently periods have been further apart.  In general states that her cycles have not been regular.  Was recently diagnosed with sleep apnea and was placed on CPAP and states that she is feeling much better at this time.  Fatigue has significantly improved.  Since last office visit she has had some increased stress over the last month as she does work for [employer] and they are currently transitioning to be part of [employer].  Does admit that she has not been taking her metformin, and also some dietary indiscretion.  On the positive side, she will be getting [employer] insurance and she does have a plan to follow-up with [employer] weight management in hopes of assistance to prevent some chronic medical conditions.    Review of Systems   Constitutional:  Negative for activity change, appetite change, diaphoresis, fatigue and unexpected weight change.   HENT:  Negative for sore throat, trouble swallowing and voice change.    Eyes:  Negative for visual disturbance.   Respiratory:  Negative for chest  "tightness and shortness of breath.    Cardiovascular:  Negative for chest pain, palpitations and leg swelling.   Gastrointestinal:  Negative for abdominal pain, constipation and diarrhea.   Endocrine: Negative for cold intolerance, heat intolerance, polydipsia, polyphagia and polyuria.   Genitourinary:  Positive for menstrual problem.   Skin:  Negative for rash.   Neurological:  Negative for dizziness, tremors, light-headedness, numbness and headaches.   Hematological:  Negative for adenopathy.   Psychiatric/Behavioral:  Negative for dysphoric mood and sleep disturbance. The patient is not nervous/anxious.     as per HPI  Medical History Reviewed by provider this encounter:     .  Medications Ordered Prior to Encounter[1]   Social History[1]     Medical History Reviewed by provider this encounter:     .    Objective   /94   Pulse 80   Ht 5' 7\" (1.702 m)   Wt 112 kg (246 lb)   BMI 38.53 kg/m²      Body mass index is 38.53 kg/m².  Wt Readings from Last 3 Encounters:   07/25/25 112 kg (246 lb)   07/07/25 112 kg (247 lb)   04/24/25 113 kg (250 lb 3.2 oz)     Physical Exam  Vitals and nursing note reviewed.   Constitutional:       General: She is not in acute distress.     Appearance: Normal appearance. She is well-developed. She is not diaphoretic.     Eyes:      General: No scleral icterus.     Extraocular Movements: Extraocular movements intact.      Conjunctiva/sclera: Conjunctivae normal.      Pupils: Pupils are equal, round, and reactive to light.       Cardiovascular:      Rate and Rhythm: Normal rate and regular rhythm.      Pulses: Normal pulses.      Heart sounds: Normal heart sounds. No murmur heard.     No friction rub. No gallop.   Pulmonary:      Effort: Pulmonary effort is normal. No tachypnea, bradypnea or respiratory distress.      Breath sounds: Normal breath sounds. No wheezing.     Musculoskeletal:      Cervical back: Normal range of motion.      Right lower leg: No edema.      Left lower " "leg: No edema.   Lymphadenopathy:      Cervical: No cervical adenopathy.     Skin:     General: Skin is warm and dry.     Neurological:      Mental Status: She is alert and oriented to person, place, and time. Mental status is at baseline. She is not disoriented.      Motor: No abnormal muscle tone.      Gait: Gait normal.      Deep Tendon Reflexes: Reflexes are normal and symmetric.     Psychiatric:         Mood and Affect: Mood normal.         Behavior: Behavior normal.         Thought Content: Thought content normal.         Labs: I have reviewed pertinent labs including:   Lab Results   Component Value Date    HGBA1C 6.3 (H) 07/23/2025    HGBA1C 6.4 02/20/2025    HGBA1C 6.4 (H) 01/08/2025      Lab Results   Component Value Date    CREATININE 0.80 07/23/2025    CREATININE 0.96 01/08/2025    CREATININE 0.89 06/15/2023    BUN 16 07/23/2025    K 3.9 07/23/2025     07/23/2025    CO2 30 07/23/2025      eGFR   Date Value Ref Range Status   07/23/2025 89 ml/min/1.73sq m Final      No results found for: \"CHOL\", \"HDL\", \"LDL\", \"TRIG\", \"CHOLHDL\"   ALT   Date Value Ref Range Status   07/23/2025 32 7 - 52 U/L Final     Comment:     Specimen collection should occur prior to Sulfasalazine administration due to the potential for falsely depressed results.    01/08/2025 35 (H) 0 - 32 IU/L Final     AST   Date Value Ref Range Status   07/23/2025 30 13 - 39 U/L Final   01/08/2025 35 0 - 40 IU/L Final     Alkaline Phosphatase   Date Value Ref Range Status   07/23/2025 67 34 - 104 U/L Final      Lab Results   Component Value Date    OOL2JGBEIFKV 0.285 (L) 07/23/2025      Lab Results   Component Value Date    FREET4 1.15 (H) 07/23/2025      Patient Instructions   Continue Synthroid 200 mcg daily.     Continue metformin 500 mg daily.    Continue calcitriol 1 mcg daily and calcium 600 mg twice a day.     Contact the office with any concerns or questions, or any change in symptoms.    Get lab work in about 3 months.    Follow-up in " 6 months with lab work completed prior to visit.    Discussed with the patient and all questioned fully answered. She will call me if any problems arise.           [1]  Current Outpatient Medications on File Prior to Visit   Medication Sig Dispense Refill   • albuterol (Ventolin HFA) 90 mcg/act inhaler Inhale 2 puffs every 6 (six) hours as needed for wheezing 18 g 3   • calcitriol (ROCALTROL) 0.5 MCG capsule Take 2 capsules (1 mcg total) by mouth daily 60 capsule 11   • Calcium Carbonate ([employer] CALCIUM-600 PO) Take 1,200 mg by mouth     • hydroCHLOROthiazide 25 mg tablet Take 1 tablet (25 mg total) by mouth every morning 90 tablet 3   • metFORMIN (GLUCOPHAGE-XR) 500 mg 24 hr tablet Take 1 tablet (500 mg total) by mouth daily with breakfast 90 tablet 1   • Potassium Chloride ER 20 MEQ TBCR Take 2 tablets (40 mEq total) by mouth 2 (two) times a day 120 tablet 11   • Synthroid 200 MCG tablet take 1 tablet by mouth daily 30 tablet 5   • ibuprofen (MOTRIN) 600 mg tablet  (Patient not taking: Reported on 2025)       No current facility-administered medications on file prior to visit.   [1]  Social History  Tobacco Use   • Smoking status: Former     Current packs/day: 0.00     Average packs/day: 0.5 packs/day for 10.0 years (5.0 ttl pk-yrs)     Types: Cigarettes     Quit date:      Years since quittin.5   • Smokeless tobacco: Never   Vaping Use   • Vaping status: Never Used   Substance and Sexual Activity   • Alcohol use: Yes     Alcohol/week: 1.0 standard drink of alcohol     Types: 1 Standard drinks or equivalent per week   • Drug use: Not Currently   • Sexual activity: Yes     Partners: Male     Birth control/protection: Condom Male

## 2025-07-25 NOTE — ASSESSMENT & PLAN NOTE
A1c did decrease slightly.  Does admit to some increased stress and dietary indiscretion as she does work for [employer] and she has been transitioning to work with [email or web address].  That being said she does plan on following up with [email or web address] weight management in the future to hopefully help with her weight which should also help with her prediabetes.  At this time she will continue with metformin 500 mg daily.  Orders:  •  Hemoglobin A1C; Future  •  Hemoglobin A1C; Future

## 2025-07-25 NOTE — ASSESSMENT & PLAN NOTE
PTH levels remain low, but calcium and vitamin D levels are stable.  At this time she will continue with calcitriol 1 mcg daily and calcium 600 mg twice a day.  Orders:  •  Comprehensive metabolic panel; Future  •  Comprehensive metabolic panel; Future  •  PTH, intact; Future  •  Vitamin D 25 hydroxy; Future

## 2025-07-25 NOTE — ASSESSMENT & PLAN NOTE
Most recent thyroid lab work came back with a normal free T4 and a slightly low TSH.  Clinically euthyroid at this time.  Will not make any adjustments to her Synthroid and she will continue 200 mcg daily.  Since she plans on following up with [facility] weight management, she is hoping to lose some weight which we will likely have to back off on her Synthroid.  Did discuss symptoms of hyper and hypothyroidism.  If there is any concerns, please contact the office.  At this time we will have her repeat lab work in 3 months with a follow-up appointment in 6 months.  Orders:  •  TSH, 3rd generation; Future  •  T4, free; Future  •  TSH, 3rd generation; Future  •  T4, free; Future

## 2025-08-12 ENCOUNTER — TELEPHONE (OUTPATIENT)
Dept: BARIATRICS | Facility: CLINIC | Age: 45
End: 2025-08-12

## 2025-08-19 DIAGNOSIS — E89.2 HYPOPARATHYROIDISM AFTER PROCEDURE (HCC): ICD-10-CM

## 2025-08-19 DIAGNOSIS — I10 HYPERTENSION, UNSPECIFIED TYPE: ICD-10-CM

## 2025-08-19 DIAGNOSIS — R73.03 PREDIABETES: ICD-10-CM

## 2025-08-19 DIAGNOSIS — E89.0 POSTOPERATIVE HYPOTHYROIDISM: ICD-10-CM

## 2025-08-19 RX ORDER — HYDROCHLOROTHIAZIDE 25 MG/1
25 TABLET ORAL EVERY MORNING
Qty: 90 TABLET | Refills: 1 | Status: SHIPPED | OUTPATIENT
Start: 2025-08-19

## 2025-08-19 RX ORDER — LEVOTHYROXINE SODIUM 200 MCG
TABLET ORAL
Qty: 90 TABLET | Refills: 1 | Status: SHIPPED | OUTPATIENT
Start: 2025-08-19

## 2025-08-19 RX ORDER — CALCITRIOL 0.5 UG/1
1 CAPSULE, LIQUID FILLED ORAL DAILY
Qty: 180 CAPSULE | Refills: 1 | Status: SHIPPED | OUTPATIENT
Start: 2025-08-19

## 2025-08-19 RX ORDER — POTASSIUM CHLORIDE 1500 MG/1
2 TABLET, EXTENDED RELEASE ORAL 2 TIMES DAILY
Qty: 360 TABLET | Refills: 1 | Status: SHIPPED | OUTPATIENT
Start: 2025-08-19

## 2025-08-19 RX ORDER — METFORMIN HYDROCHLORIDE 500 MG/1
500 TABLET, EXTENDED RELEASE ORAL
Qty: 90 TABLET | Refills: 1 | Status: SHIPPED | OUTPATIENT
Start: 2025-08-19